# Patient Record
Sex: FEMALE | Race: BLACK OR AFRICAN AMERICAN | NOT HISPANIC OR LATINO | Employment: OTHER | URBAN - METROPOLITAN AREA
[De-identification: names, ages, dates, MRNs, and addresses within clinical notes are randomized per-mention and may not be internally consistent; named-entity substitution may affect disease eponyms.]

---

## 2021-08-19 ENCOUNTER — HOSPITAL ENCOUNTER (INPATIENT)
Facility: HOSPITAL | Age: 76
LOS: 6 days | Discharge: HOME HEALTH | End: 2021-08-25
Attending: EMERGENCY MEDICINE | Admitting: INTERNAL MEDICINE

## 2021-08-19 ENCOUNTER — APPOINTMENT (OUTPATIENT)
Dept: GENERAL RADIOLOGY | Facility: HOSPITAL | Age: 76
End: 2021-08-19

## 2021-08-19 DIAGNOSIS — A41.9 SEPSIS WITHOUT ACUTE ORGAN DYSFUNCTION, DUE TO UNSPECIFIED ORGANISM (HCC): ICD-10-CM

## 2021-08-19 DIAGNOSIS — J18.9 COMMUNITY ACQUIRED PNEUMONIA OF RIGHT UPPER LOBE OF LUNG: Primary | ICD-10-CM

## 2021-08-19 PROBLEM — U07.1 COVID-19 VIRUS DETECTED: Status: ACTIVE | Noted: 2021-08-19

## 2021-08-19 PROBLEM — R91.8 LUNG MASS: Status: ACTIVE | Noted: 2021-08-19

## 2021-08-19 PROBLEM — B33.8 RSV INFECTION: Status: ACTIVE | Noted: 2021-08-19

## 2021-08-19 LAB
ALBUMIN SERPL-MCNC: 3.4 G/DL (ref 3.5–5.2)
ALBUMIN/GLOB SERPL: 0.6 G/DL
ALP SERPL-CCNC: 105 U/L (ref 39–117)
ALT SERPL W P-5'-P-CCNC: 66 U/L (ref 1–33)
ANION GAP SERPL CALCULATED.3IONS-SCNC: 12.4 MMOL/L (ref 5–15)
AST SERPL-CCNC: 111 U/L (ref 1–32)
B PARAPERT DNA SPEC QL NAA+PROBE: NOT DETECTED
B PERT DNA SPEC QL NAA+PROBE: NOT DETECTED
BASOPHILS # BLD AUTO: 0.11 10*3/MM3 (ref 0–0.2)
BASOPHILS NFR BLD AUTO: 0.8 % (ref 0–1.5)
BILIRUB SERPL-MCNC: 1.4 MG/DL (ref 0–1.2)
BUN SERPL-MCNC: 38 MG/DL (ref 8–23)
BUN/CREAT SERPL: 40.4 (ref 7–25)
C PNEUM DNA NPH QL NAA+NON-PROBE: NOT DETECTED
CALCIUM SPEC-SCNC: 9.7 MG/DL (ref 8.6–10.5)
CHLORIDE SERPL-SCNC: 106 MMOL/L (ref 98–107)
CO2 SERPL-SCNC: 25.6 MMOL/L (ref 22–29)
CREAT SERPL-MCNC: 0.94 MG/DL (ref 0.57–1)
D-LACTATE SERPL-SCNC: 2 MMOL/L (ref 0.5–2)
D-LACTATE SERPL-SCNC: 3 MMOL/L (ref 0.5–2)
DEPRECATED RDW RBC AUTO: 40.7 FL (ref 37–54)
EOSINOPHIL # BLD AUTO: 0 10*3/MM3 (ref 0–0.4)
EOSINOPHIL NFR BLD AUTO: 0 % (ref 0.3–6.2)
ERYTHROCYTE [DISTWIDTH] IN BLOOD BY AUTOMATED COUNT: 12.6 % (ref 12.3–15.4)
FLUAV SUBTYP SPEC NAA+PROBE: NOT DETECTED
FLUBV RNA ISLT QL NAA+PROBE: NOT DETECTED
GFR SERPL CREATININE-BSD FRML MDRD: 71 ML/MIN/1.73
GLOBULIN UR ELPH-MCNC: 5.7 GM/DL
GLUCOSE SERPL-MCNC: 96 MG/DL (ref 65–99)
HADV DNA SPEC NAA+PROBE: NOT DETECTED
HCOV 229E RNA SPEC QL NAA+PROBE: NOT DETECTED
HCOV HKU1 RNA SPEC QL NAA+PROBE: NOT DETECTED
HCOV NL63 RNA SPEC QL NAA+PROBE: NOT DETECTED
HCOV OC43 RNA SPEC QL NAA+PROBE: NOT DETECTED
HCT VFR BLD AUTO: 43.5 % (ref 34–46.6)
HGB BLD-MCNC: 14.8 G/DL (ref 12–15.9)
HMPV RNA NPH QL NAA+NON-PROBE: NOT DETECTED
HOLD SPECIMEN: NORMAL
HOLD SPECIMEN: NORMAL
HPIV1 RNA SPEC QL NAA+PROBE: NOT DETECTED
HPIV2 RNA SPEC QL NAA+PROBE: NOT DETECTED
HPIV3 RNA NPH QL NAA+PROBE: NOT DETECTED
HPIV4 P GENE NPH QL NAA+PROBE: NOT DETECTED
IMM GRANULOCYTES # BLD AUTO: 0.3 10*3/MM3 (ref 0–0.05)
IMM GRANULOCYTES NFR BLD AUTO: 2.1 % (ref 0–0.5)
LYMPHOCYTES # BLD AUTO: 0.57 10*3/MM3 (ref 0.7–3.1)
LYMPHOCYTES NFR BLD AUTO: 3.9 % (ref 19.6–45.3)
M PNEUMO IGG SER IA-ACNC: NOT DETECTED
MCH RBC QN AUTO: 30.6 PG (ref 26.6–33)
MCHC RBC AUTO-ENTMCNC: 34 G/DL (ref 31.5–35.7)
MCV RBC AUTO: 90.1 FL (ref 79–97)
MONOCYTES # BLD AUTO: 0.74 10*3/MM3 (ref 0.1–0.9)
MONOCYTES NFR BLD AUTO: 5.1 % (ref 5–12)
NEUTROPHILS NFR BLD AUTO: 12.9 10*3/MM3 (ref 1.7–7)
NEUTROPHILS NFR BLD AUTO: 88.1 % (ref 42.7–76)
NRBC BLD AUTO-RTO: 0.1 /100 WBC (ref 0–0.2)
NT-PROBNP SERPL-MCNC: 400.8 PG/ML (ref 0–900)
PLATELET # BLD AUTO: 442 10*3/MM3 (ref 140–450)
PMV BLD AUTO: 11.8 FL (ref 6–12)
POTASSIUM SERPL-SCNC: 3.6 MMOL/L (ref 3.5–5.2)
PROCALCITONIN SERPL-MCNC: 4.8 NG/ML (ref 0–0.25)
PROT SERPL-MCNC: 9.1 G/DL (ref 6–8.5)
QT INTERVAL: 311 MS
RBC # BLD AUTO: 4.83 10*6/MM3 (ref 3.77–5.28)
RHINOVIRUS RNA SPEC NAA+PROBE: NOT DETECTED
RSV RNA NPH QL NAA+NON-PROBE: DETECTED
SARS-COV-2 RNA NPH QL NAA+NON-PROBE: DETECTED
SODIUM SERPL-SCNC: 144 MMOL/L (ref 136–145)
TROPONIN T SERPL-MCNC: <0.01 NG/ML (ref 0–0.03)
WBC # BLD AUTO: 14.62 10*3/MM3 (ref 3.4–10.8)
WHOLE BLOOD HOLD SPECIMEN: NORMAL

## 2021-08-19 PROCEDURE — 93005 ELECTROCARDIOGRAM TRACING: CPT | Performed by: PHYSICIAN ASSISTANT

## 2021-08-19 PROCEDURE — 93010 ELECTROCARDIOGRAM REPORT: CPT | Performed by: INTERNAL MEDICINE

## 2021-08-19 PROCEDURE — 94799 UNLISTED PULMONARY SVC/PX: CPT

## 2021-08-19 PROCEDURE — 80053 COMPREHEN METABOLIC PANEL: CPT | Performed by: PHYSICIAN ASSISTANT

## 2021-08-19 PROCEDURE — 99284 EMERGENCY DEPT VISIT MOD MDM: CPT

## 2021-08-19 PROCEDURE — 71045 X-RAY EXAM CHEST 1 VIEW: CPT

## 2021-08-19 PROCEDURE — 25010000003 AMPICILLIN-SULBACTAM PER 1.5 G: Performed by: PHYSICIAN ASSISTANT

## 2021-08-19 PROCEDURE — 25010000002 AZITHROMYCIN PER 500 MG: Performed by: PHYSICIAN ASSISTANT

## 2021-08-19 PROCEDURE — 85025 COMPLETE CBC W/AUTO DIFF WBC: CPT | Performed by: PHYSICIAN ASSISTANT

## 2021-08-19 PROCEDURE — 0202U NFCT DS 22 TRGT SARS-COV-2: CPT | Performed by: PHYSICIAN ASSISTANT

## 2021-08-19 PROCEDURE — 94640 AIRWAY INHALATION TREATMENT: CPT

## 2021-08-19 PROCEDURE — 36415 COLL VENOUS BLD VENIPUNCTURE: CPT | Performed by: PHYSICIAN ASSISTANT

## 2021-08-19 PROCEDURE — 83880 ASSAY OF NATRIURETIC PEPTIDE: CPT | Performed by: PHYSICIAN ASSISTANT

## 2021-08-19 PROCEDURE — 84484 ASSAY OF TROPONIN QUANT: CPT | Performed by: PHYSICIAN ASSISTANT

## 2021-08-19 PROCEDURE — 83605 ASSAY OF LACTIC ACID: CPT | Performed by: PHYSICIAN ASSISTANT

## 2021-08-19 PROCEDURE — 87040 BLOOD CULTURE FOR BACTERIA: CPT | Performed by: PHYSICIAN ASSISTANT

## 2021-08-19 PROCEDURE — 84145 PROCALCITONIN (PCT): CPT | Performed by: PHYSICIAN ASSISTANT

## 2021-08-19 RX ORDER — SODIUM CHLORIDE 9 MG/ML
75 INJECTION, SOLUTION INTRAVENOUS CONTINUOUS
Status: DISCONTINUED | OUTPATIENT
Start: 2021-08-19 | End: 2021-08-23

## 2021-08-19 RX ORDER — ACETAMINOPHEN 325 MG/1
650 TABLET ORAL EVERY 4 HOURS PRN
Status: DISCONTINUED | OUTPATIENT
Start: 2021-08-19 | End: 2021-08-25 | Stop reason: HOSPADM

## 2021-08-19 RX ORDER — LORATADINE 10 MG/1
10 TABLET ORAL DAILY
COMMUNITY

## 2021-08-19 RX ORDER — MEDROXYPROGESTERONE ACETATE 150 MG/ML
INJECTION, SUSPENSION INTRAMUSCULAR WEEKLY
COMMUNITY
End: 2021-08-25 | Stop reason: HOSPADM

## 2021-08-19 RX ORDER — ALBUTEROL SULFATE 90 UG/1
2 AEROSOL, METERED RESPIRATORY (INHALATION)
Status: DISCONTINUED | OUTPATIENT
Start: 2021-08-19 | End: 2021-08-25 | Stop reason: HOSPADM

## 2021-08-19 RX ORDER — ONDANSETRON 2 MG/ML
4 INJECTION INTRAMUSCULAR; INTRAVENOUS EVERY 6 HOURS PRN
Status: DISCONTINUED | OUTPATIENT
Start: 2021-08-19 | End: 2021-08-25 | Stop reason: HOSPADM

## 2021-08-19 RX ORDER — SODIUM CHLORIDE 0.9 % (FLUSH) 0.9 %
10 SYRINGE (ML) INJECTION AS NEEDED
Status: DISCONTINUED | OUTPATIENT
Start: 2021-08-19 | End: 2021-08-25 | Stop reason: HOSPADM

## 2021-08-19 RX ORDER — SODIUM CHLORIDE 0.9 % (FLUSH) 0.9 %
10 SYRINGE (ML) INJECTION EVERY 12 HOURS SCHEDULED
Status: DISCONTINUED | OUTPATIENT
Start: 2021-08-19 | End: 2021-08-25 | Stop reason: HOSPADM

## 2021-08-19 RX ORDER — ENTECAVIR 0.5 MG/1
0.5 TABLET, FILM COATED ORAL DAILY
COMMUNITY
End: 2021-08-25 | Stop reason: HOSPADM

## 2021-08-19 RX ORDER — ACETAMINOPHEN 650 MG/1
650 SUPPOSITORY RECTAL EVERY 4 HOURS PRN
Status: DISCONTINUED | OUTPATIENT
Start: 2021-08-19 | End: 2021-08-25 | Stop reason: HOSPADM

## 2021-08-19 RX ORDER — ACETAMINOPHEN 160 MG/5ML
650 SOLUTION ORAL EVERY 4 HOURS PRN
Status: DISCONTINUED | OUTPATIENT
Start: 2021-08-19 | End: 2021-08-25 | Stop reason: HOSPADM

## 2021-08-19 RX ORDER — NITROGLYCERIN 0.4 MG/1
0.4 TABLET SUBLINGUAL
Status: DISCONTINUED | OUTPATIENT
Start: 2021-08-19 | End: 2021-08-25 | Stop reason: HOSPADM

## 2021-08-19 RX ADMIN — ALBUTEROL SULFATE 2 PUFF: 90 AEROSOL, METERED RESPIRATORY (INHALATION) at 23:08

## 2021-08-19 RX ADMIN — AZITHROMYCIN 500 MG: 500 INJECTION, POWDER, LYOPHILIZED, FOR SOLUTION INTRAVENOUS at 18:06

## 2021-08-19 RX ADMIN — SODIUM CHLORIDE 75 ML/HR: 9 INJECTION, SOLUTION INTRAVENOUS at 22:15

## 2021-08-19 RX ADMIN — ACETAMINOPHEN 650 MG: 325 TABLET, FILM COATED ORAL at 21:43

## 2021-08-19 RX ADMIN — AMPICILLIN AND SULBACTAM 3 G: 2; 1 INJECTION, POWDER, FOR SOLUTION INTRAMUSCULAR; INTRAVENOUS at 16:31

## 2021-08-19 RX ADMIN — SODIUM CHLORIDE, PRESERVATIVE FREE 10 ML: 5 INJECTION INTRAVENOUS at 22:14

## 2021-08-19 NOTE — ED NOTES
Pt wearing mask. Myself had mask, and eye wear/PPE when present with pt. Hand hygiene performed before and after pt care.     nAa Baldwin, PCT  08/19/21 3377

## 2021-08-19 NOTE — ED PROVIDER NOTES
EMERGENCY DEPARTMENT ENCOUNTER    Room Number:  02/02  Date of encounter:  8/19/2021  PCP: Provider, No Known  Historian: Patient  Full history not obtainable due to: None    HPI:  Chief Complaint: Fatigue    Context: Loreto Green is a 76 y.o. female who presents to the ED c/o fatigue, shortness of breath, cough, nausea, diarrhea for the past several days. She has not been feeling well for the past week and a half but symptoms have progressively worsened over the past few days. Patient's daughter tested positive for COVID-19. The patient has not been vaccinated. States that her cough is dry and nonproductive. She denies chest pain, dizziness, syncope, headache.          PAST MEDICAL HISTORY    Active Ambulatory Problems     Diagnosis Date Noted   • No Active Ambulatory Problems     Resolved Ambulatory Problems     Diagnosis Date Noted   • No Resolved Ambulatory Problems     No Additional Past Medical History         PAST SURGICAL HISTORY  No past surgical history on file.      FAMILY HISTORY  No family history on file.      SOCIAL HISTORY  Social History     Socioeconomic History   • Marital status: Single     Spouse name: Not on file   • Number of children: Not on file   • Years of education: Not on file   • Highest education level: Not on file         ALLERGIES  Patient has no allergy information on record.        REVIEW OF SYSTEMS  Review of Systems   Constitutional: Positive for fatigue. Negative for chills and fever.   HENT: Negative for congestion.    Eyes: Negative for visual disturbance.   Respiratory: Positive for cough and shortness of breath.    Cardiovascular: Negative for chest pain.   Gastrointestinal: Positive for diarrhea and nausea. Negative for abdominal pain and vomiting.   Genitourinary: Negative for difficulty urinating.   Musculoskeletal: Positive for myalgias. Negative for gait problem.   Skin: Negative for wound.   Neurological: Negative for syncope.   Psychiatric/Behavioral: Negative  for suicidal ideas.      All systems reviewed and marked as negative except as listed in HPI       PHYSICAL EXAM    I have reviewed the triage vital signs and nursing notes.    ED Triage Vitals [08/19/21 1330]   Temp Heart Rate Resp BP SpO2   96.8 °F (36 °C) (!) 128 16 156/82 96 %      Temp src Heart Rate Source Patient Position BP Location FiO2 (%)   -- -- -- -- --       Physical Exam  Constitutional:       General: She is not in acute distress.     Appearance: She is well-developed.   HENT:      Head: Normocephalic and atraumatic.   Eyes:      General: No scleral icterus.     Conjunctiva/sclera: Conjunctivae normal.   Neck:      Trachea: No tracheal deviation.   Cardiovascular:      Rate and Rhythm: Regular rhythm. Tachycardia present.   Pulmonary:      Effort: Pulmonary effort is normal.      Breath sounds: Rhonchi present.   Abdominal:      Palpations: Abdomen is soft.      Tenderness: There is no abdominal tenderness.   Musculoskeletal:         General: No deformity.      Cervical back: Normal range of motion.      Comments: Moving all extremities spontaneously   Lymphadenopathy:      Cervical: No cervical adenopathy.   Skin:     General: Skin is warm and dry.   Neurological:      Mental Status: She is alert and oriented to person, place, and time.   Psychiatric:         Behavior: Behavior normal.         Vital signs and nursing notes reviewed.            LAB RESULTS  Recent Results (from the past 24 hour(s))   ECG 12 Lead    Collection Time: 08/19/21  2:28 PM   Result Value Ref Range    QT Interval 311 ms   BNP    Collection Time: 08/19/21  3:01 PM    Specimen: Blood   Result Value Ref Range    proBNP 400.8 0.0 - 900.0 pg/mL   Troponin    Collection Time: 08/19/21  3:01 PM    Specimen: Blood   Result Value Ref Range    Troponin T <0.010 0.000 - 0.030 ng/mL   CBC Auto Differential    Collection Time: 08/19/21  3:01 PM    Specimen: Blood   Result Value Ref Range    WBC 14.62 (H) 3.40 - 10.80 10*3/mm3    RBC 4.83  3.77 - 5.28 10*6/mm3    Hemoglobin 14.8 12.0 - 15.9 g/dL    Hematocrit 43.5 34.0 - 46.6 %    MCV 90.1 79.0 - 97.0 fL    MCH 30.6 26.6 - 33.0 pg    MCHC 34.0 31.5 - 35.7 g/dL    RDW 12.6 12.3 - 15.4 %    RDW-SD 40.7 37.0 - 54.0 fl    MPV 11.8 6.0 - 12.0 fL    Platelets 442 140 - 450 10*3/mm3    Neutrophil % 88.1 (H) 42.7 - 76.0 %    Lymphocyte % 3.9 (L) 19.6 - 45.3 %    Monocyte % 5.1 5.0 - 12.0 %    Eosinophil % 0.0 (L) 0.3 - 6.2 %    Basophil % 0.8 0.0 - 1.5 %    Immature Grans % 2.1 (H) 0.0 - 0.5 %    Neutrophils, Absolute 12.90 (H) 1.70 - 7.00 10*3/mm3    Lymphocytes, Absolute 0.57 (L) 0.70 - 3.10 10*3/mm3    Monocytes, Absolute 0.74 0.10 - 0.90 10*3/mm3    Eosinophils, Absolute 0.00 0.00 - 0.40 10*3/mm3    Basophils, Absolute 0.11 0.00 - 0.20 10*3/mm3    Immature Grans, Absolute 0.30 (H) 0.00 - 0.05 10*3/mm3    nRBC 0.1 0.0 - 0.2 /100 WBC   Procalcitonin    Collection Time: 08/19/21  3:01 PM    Specimen: Blood   Result Value Ref Range    Procalcitonin 4.80 (H) 0.00 - 0.25 ng/mL       Ordered the above labs and independently reviewed the results.        RADIOLOGY  XR Chest 1 View    Result Date: 8/19/2021  PORTABLE CHEST 08/19/2021 AT 2:27 PM  CLINICAL HISTORY: CHF. COPD.  There is no previous chest x-ray for comparison.  There is a large approximately 8.4 x 6.4 cm diameter focus of dense consolidation within the right upper lobe consistent with pneumonia. An underlying mass in this region cannot be excluded. Continued chest x-ray follow-up is recommended to document complete clearing with appropriate medical treatment. The left lung is well expanded and clear. There are no pleural effusions. The cardiomediastinal silhouette is unremarkable. 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 The humeral stem component of the prosthesis has eroded through the lateral margin of the humerus, and is located within the soft tissues of the forearm. There is marked erosion of the humeral head and glenoid fossa, and the humerus  itself has an overall gracile appearance.  IMPRESSIONS: Dense consolidation in the right upper lobe consistent with pneumonia as described in more detail above.  This report was finalized on 8/19/2021 2:48 PM by Dr. Burak Wheeler M.D.        I ordered the above noted radiological studies. Independently reviewed by me and discussed with radiologist.  See dictation above for official radiology interpretation.      PROCEDURES    Procedures        MEDICATIONS GIVEN IN ER    Medications   sodium chloride 0.9 % flush 10 mL (has no administration in time range)   ampicillin-sulbactam (UNASYN) 3 g in sodium chloride 0.9 % 100 mL IVPB-VTB (has no administration in time range)   AZITHROMYCIN 500 MG/250 ML 0.9% NS IVPB (vial-mate) (has no administration in time range)         PROGRESS, DATA ANALYSIS, CONSULTS, AND MEDICAL DECISION MAKING    All labs have been independently reviewed by me.  All radiology studies have been reviewed by me.   EKG's independently reviewed by me.  Discussion below represents my analysis of pertinent findings related to patient's condition, differential diagnosis, treatment plan and final disposition.    DIFFERENTIAL DIAGNOSIS INCLUDE BUT NOT LIMITED TO:     COVID-19, bacterial pneumonia, viral syndrome    ED Course as of Aug 19 1545   Thu Aug 19, 2021   1456 Heart Rate(!): 128 [DC]   1457 I viewed CXR on PACS system.  My interpretation is no pneumothorax.        [DC]   1459 EKG interpreted by myself.  Time 1428.  Sinus rhythm.  Heart 119.  Normal axis.  Normal intervals.  No acute ST abnormality.    [TD]   1515 WBC(!): 14.62 [DC]   1544 I spoke with MD Zachary at this time regarding the patient.  I discussed work-up, results, concerns.  I discussed the consulting provider's desire for tele admit.        [DC]      ED Course User Index  [DC] Burak Mcfarland PA  [TD] Carl Wright II, MD       AS OF 15:45 EDT VITALS:    BP - 156/82  HR - (!) 128  TEMP - 96.8 °F (36 °C)  02 SATS - 96%    1546 I  rechecked the patient.  I discussed the patient's radiology findings (including all incidental findings), diagnosis, and plan for admission.  All questions answered.    DIAGNOSIS  Final diagnoses:   Community acquired pneumonia of right upper lobe of lung   Sepsis without acute organ dysfunction, due to unspecified organism (CMS/HCC)         DISPOSITION  D/C    Pt masked in first look. I wore a surgical mask throughout my encounters with the pt. I performed hand hygiene on entry into the pt room and upon exit.      Burak Mcfarland PA  08/19/21 1548

## 2021-08-19 NOTE — ED TRIAGE NOTES
Pt to ER via EMS from home. Per EMS, pt c/o generalized weakness x1.5 weeks. Pt's daughters tested positive for COVID.     Patient in mask. This RN in appropriate PPE throughout the patient's entire encounter.

## 2021-08-20 ENCOUNTER — APPOINTMENT (OUTPATIENT)
Dept: CT IMAGING | Facility: HOSPITAL | Age: 76
End: 2021-08-20

## 2021-08-20 PROBLEM — J12.82 PNEUMONIA DUE TO COVID-19 VIRUS: Status: ACTIVE | Noted: 2021-08-19

## 2021-08-20 PROBLEM — E87.6 HYPOKALEMIA: Status: ACTIVE | Noted: 2021-08-20

## 2021-08-20 LAB
ALBUMIN SERPL-MCNC: 2.2 G/DL (ref 3.5–5.2)
ALBUMIN/GLOB SERPL: 0.5 G/DL
ALP SERPL-CCNC: 76 U/L (ref 39–117)
ALT SERPL W P-5'-P-CCNC: 53 U/L (ref 1–33)
ANION GAP SERPL CALCULATED.3IONS-SCNC: 10.7 MMOL/L (ref 5–15)
AST SERPL-CCNC: 88 U/L (ref 1–32)
BASOPHILS # BLD MANUAL: 0.08 10*3/MM3 (ref 0–0.2)
BASOPHILS NFR BLD AUTO: 1 % (ref 0–1.5)
BILIRUB SERPL-MCNC: 0.7 MG/DL (ref 0–1.2)
BUN SERPL-MCNC: 33 MG/DL (ref 8–23)
BUN/CREAT SERPL: 48.5 (ref 7–25)
BURR CELLS BLD QL SMEAR: ABNORMAL
CALCIUM SPEC-SCNC: 8.1 MG/DL (ref 8.6–10.5)
CHLORIDE SERPL-SCNC: 110 MMOL/L (ref 98–107)
CO2 SERPL-SCNC: 23.3 MMOL/L (ref 22–29)
CREAT SERPL-MCNC: 0.68 MG/DL (ref 0.57–1)
CRP SERPL-MCNC: 23.78 MG/DL (ref 0–0.5)
DEPRECATED RDW RBC AUTO: 40.6 FL (ref 37–54)
EOSINOPHIL # BLD MANUAL: 0.08 10*3/MM3 (ref 0–0.4)
EOSINOPHIL NFR BLD MANUAL: 1 % (ref 0.3–6.2)
ERYTHROCYTE [DISTWIDTH] IN BLOOD BY AUTOMATED COUNT: 12.2 % (ref 12.3–15.4)
GFR SERPL CREATININE-BSD FRML MDRD: 103 ML/MIN/1.73
GLOBULIN UR ELPH-MCNC: 4.4 GM/DL
GLUCOSE SERPL-MCNC: 83 MG/DL (ref 65–99)
HCT VFR BLD AUTO: 32.3 % (ref 34–46.6)
HGB BLD-MCNC: 10.8 G/DL (ref 12–15.9)
LYMPHOCYTES # BLD MANUAL: 0.89 10*3/MM3 (ref 0.7–3.1)
LYMPHOCYTES NFR BLD MANUAL: 11.1 % (ref 19.6–45.3)
LYMPHOCYTES NFR BLD MANUAL: 7.1 % (ref 5–12)
MCH RBC QN AUTO: 30.7 PG (ref 26.6–33)
MCHC RBC AUTO-ENTMCNC: 33.4 G/DL (ref 31.5–35.7)
MCV RBC AUTO: 91.8 FL (ref 79–97)
MONOCYTES # BLD AUTO: 0.57 10*3/MM3 (ref 0.1–0.9)
NEUTROPHILS # BLD AUTO: 6.43 10*3/MM3 (ref 1.7–7)
NEUTROPHILS NFR BLD MANUAL: 79.8 % (ref 42.7–76)
PLAT MORPH BLD: NORMAL
PLATELET # BLD AUTO: 388 10*3/MM3 (ref 140–450)
PMV BLD AUTO: 10.8 FL (ref 6–12)
POIKILOCYTOSIS BLD QL SMEAR: ABNORMAL
POTASSIUM SERPL-SCNC: 3.4 MMOL/L (ref 3.5–5.2)
PROCALCITONIN SERPL-MCNC: 2.92 NG/ML (ref 0–0.25)
PROT SERPL-MCNC: 6.6 G/DL (ref 6–8.5)
RBC # BLD AUTO: 3.52 10*6/MM3 (ref 3.77–5.28)
SODIUM SERPL-SCNC: 144 MMOL/L (ref 136–145)
WBC # BLD AUTO: 8.06 10*3/MM3 (ref 3.4–10.8)
WBC MORPH BLD: NORMAL

## 2021-08-20 PROCEDURE — 86140 C-REACTIVE PROTEIN: CPT | Performed by: HOSPITALIST

## 2021-08-20 PROCEDURE — 85007 BL SMEAR W/DIFF WBC COUNT: CPT | Performed by: INTERNAL MEDICINE

## 2021-08-20 PROCEDURE — 97165 OT EVAL LOW COMPLEX 30 MIN: CPT

## 2021-08-20 PROCEDURE — 97535 SELF CARE MNGMENT TRAINING: CPT

## 2021-08-20 PROCEDURE — 80053 COMPREHEN METABOLIC PANEL: CPT | Performed by: INTERNAL MEDICINE

## 2021-08-20 PROCEDURE — 97161 PT EVAL LOW COMPLEX 20 MIN: CPT

## 2021-08-20 PROCEDURE — 25010000003 AMPICILLIN-SULBACTAM PER 1.5 G: Performed by: HOSPITALIST

## 2021-08-20 PROCEDURE — 92610 EVALUATE SWALLOWING FUNCTION: CPT

## 2021-08-20 PROCEDURE — 94799 UNLISTED PULMONARY SVC/PX: CPT

## 2021-08-20 PROCEDURE — 85025 COMPLETE CBC W/AUTO DIFF WBC: CPT | Performed by: INTERNAL MEDICINE

## 2021-08-20 PROCEDURE — 25010000003 AMPICILLIN-SULBACTAM PER 1.5 G: Performed by: INTERNAL MEDICINE

## 2021-08-20 PROCEDURE — 97110 THERAPEUTIC EXERCISES: CPT

## 2021-08-20 PROCEDURE — 97530 THERAPEUTIC ACTIVITIES: CPT

## 2021-08-20 PROCEDURE — 71250 CT THORAX DX C-: CPT

## 2021-08-20 PROCEDURE — 97166 OT EVAL MOD COMPLEX 45 MIN: CPT

## 2021-08-20 PROCEDURE — 25010000002 ENOXAPARIN PER 10 MG: Performed by: INTERNAL MEDICINE

## 2021-08-20 PROCEDURE — 84145 PROCALCITONIN (PCT): CPT | Performed by: INTERNAL MEDICINE

## 2021-08-20 RX ORDER — POTASSIUM CHLORIDE 1.5 G/1.77G
40 POWDER, FOR SOLUTION ORAL ONCE
Status: COMPLETED | OUTPATIENT
Start: 2021-08-20 | End: 2021-08-20

## 2021-08-20 RX ADMIN — ALBUTEROL SULFATE 2 PUFF: 90 AEROSOL, METERED RESPIRATORY (INHALATION) at 20:32

## 2021-08-20 RX ADMIN — AMPICILLIN SODIUM AND SULBACTAM SODIUM 3 G: 2; 1 INJECTION, POWDER, FOR SOLUTION INTRAMUSCULAR; INTRAVENOUS at 00:12

## 2021-08-20 RX ADMIN — ALBUTEROL SULFATE 2 PUFF: 90 AEROSOL, METERED RESPIRATORY (INHALATION) at 08:35

## 2021-08-20 RX ADMIN — AMPICILLIN SODIUM AND SULBACTAM SODIUM 3 G: 2; 1 INJECTION, POWDER, FOR SOLUTION INTRAMUSCULAR; INTRAVENOUS at 17:31

## 2021-08-20 RX ADMIN — SODIUM CHLORIDE, PRESERVATIVE FREE 10 ML: 5 INJECTION INTRAVENOUS at 21:22

## 2021-08-20 RX ADMIN — ALBUTEROL SULFATE 2 PUFF: 90 AEROSOL, METERED RESPIRATORY (INHALATION) at 13:24

## 2021-08-20 RX ADMIN — SODIUM CHLORIDE, PRESERVATIVE FREE 10 ML: 5 INJECTION INTRAVENOUS at 09:32

## 2021-08-20 RX ADMIN — AMPICILLIN SODIUM AND SULBACTAM SODIUM 3 G: 2; 1 INJECTION, POWDER, FOR SOLUTION INTRAMUSCULAR; INTRAVENOUS at 09:32

## 2021-08-20 RX ADMIN — AMPICILLIN SODIUM AND SULBACTAM SODIUM 3 G: 2; 1 INJECTION, POWDER, FOR SOLUTION INTRAMUSCULAR; INTRAVENOUS at 21:51

## 2021-08-20 RX ADMIN — SODIUM CHLORIDE 75 ML/HR: 9 INJECTION, SOLUTION INTRAVENOUS at 13:31

## 2021-08-20 RX ADMIN — ENOXAPARIN SODIUM 40 MG: 40 INJECTION SUBCUTANEOUS at 09:32

## 2021-08-20 RX ADMIN — ALBUTEROL SULFATE 2 PUFF: 90 AEROSOL, METERED RESPIRATORY (INHALATION) at 15:55

## 2021-08-20 RX ADMIN — POTASSIUM CHLORIDE 40 MEQ: 1.5 POWDER, FOR SOLUTION ORAL at 13:31

## 2021-08-20 NOTE — PLAN OF CARE
Goal Outcome Evaluation:  Plan of Care Reviewed With: patient        Progress: no change  Outcome Summary: SLP attempted to see pt for swallow evaluation. Pt is in process of leaving floor for CT. Will attempt follow up this PM or as pt is available.

## 2021-08-20 NOTE — ED NOTES
.  Nursing report ED to floor  Loreto Green  76 y.o.  female    HPI (triage note):   Chief Complaint   Patient presents with   • Weakness - Generalized       Admitting doctor:   Yobany Sharma MD    Admitting diagnosis:   The primary encounter diagnosis was Community acquired pneumonia of right upper lobe of lung. A diagnosis of Sepsis without acute organ dysfunction, due to unspecified organism (CMS/HCC) was also pertinent to this visit.    Code status:   Current Code Status     Date Active Code Status Order ID Comments User Context       8/19/2021 1757 CPR [id number]  Yobany Sharma MD ED     Advance Care Planning Activity      Questions for Current Code Status     Question Answer Comment    Code Status CPR     Medical Interventions (Level of Support Prior to Arrest) Full           Allergies:   Patient has no allergy information on record.    Weight:   There were no vitals filed for this visit.    Most recent vitals:   Vitals:    08/19/21 1330 08/19/21 1808   BP: 156/82 148/87   Pulse: (!) 128 113   Resp: 16 16   Temp: 96.8 °F (36 °C)    SpO2: 96% 94%       Active LDAs/IV Access:   Lines, Drains & Airways    Active LDAs     Name:   Placement date:   Placement time:   Site:   Days:    Peripheral IV 08/19/21 1616 Left;Upper Arm   08/19/21    1616    Arm   less than 1                Labs (abnormal labs have a star):   Labs Reviewed   RESPIRATORY PANEL PCR W/ COVID-19 (SARS-COV-2) VINAYAK/GATO/JABARI/PAD/COR/MAD/BETTY IN-HOUSE, NP SWAB IN UTM/VTP, 3-4 HR TAT - Abnormal; Notable for the following components:       Result Value    COVID19 Detected (*)     RSV, PCR Detected (*)     All other components within normal limits    Narrative:     In the setting of a positive respiratory panel with a viral infection PLUS a negative procalcitonin without other underlying concern for bacterial infection, consider observing off antibiotics or discontinuation of antibiotics and continue supportive care. If the respiratory panel is positive  "for atypical bacterial infection (Bordetella pertussis, Chlamydophila pneumoniae, or Mycoplasma pneumoniae), consider antibiotic de-escalation to target atypical bacterial infection.   COMPREHENSIVE METABOLIC PANEL - Abnormal; Notable for the following components:    BUN 38 (*)     Total Protein 9.1 (*)     Albumin 3.40 (*)     ALT (SGPT) 66 (*)     AST (SGOT) 111 (*)     Total Bilirubin 1.4 (*)     BUN/Creatinine Ratio 40.4 (*)     All other components within normal limits    Narrative:     GFR Normal >60  Chronic Kidney Disease <60  Kidney Failure <15     CBC WITH AUTO DIFFERENTIAL - Abnormal; Notable for the following components:    WBC 14.62 (*)     Neutrophil % 88.1 (*)     Lymphocyte % 3.9 (*)     Eosinophil % 0.0 (*)     Immature Grans % 2.1 (*)     Neutrophils, Absolute 12.90 (*)     Lymphocytes, Absolute 0.57 (*)     Immature Grans, Absolute 0.30 (*)     All other components within normal limits   PROCALCITONIN - Abnormal; Notable for the following components:    Procalcitonin 4.80 (*)     All other components within normal limits    Narrative:     As a Marker for Sepsis (Non-Neonates):     1. <0.5 ng/mL represents a low risk of severe sepsis and/or septic shock.  2. >2 ng/mL represents a high risk of severe sepsis and/or septic shock.    As a Marker for Lower Respiratory Tract Infections that require antibiotic therapy:  PCT on Admission     Antibiotic Therapy             6-12 Hrs later  >0.5                          Strongly Recommended            >0.25 - <0.5             Recommended  0.1 - 0.25                  Discouraged                       Remeasure/reassess PCT  <0.1                         Strongly Discouraged         Remeasure/reassess PCT      As 28 day mortality risk marker: \"Change in Procalcitonin Result\" (>80% or <=80%) if Day 0 (or Day 1) and Day 4 values are available. Refer to http://www.[employer]-pct-calculator.com/    Change in PCT <=80 %   A decrease of PCT levels below or equal to 80% " defines a positive change in PCT test result representing a higher risk for 28-day all-cause mortality of patients diagnosed with severe sepsis or septic shock.    Change in PCT >80 %   A decrease of PCT levels of more than 80% defines a negative change in PCT result representing a lower risk for 28-day all-cause mortality of patients diagnosed with severe sepsis or septic shock.              Results may be falsely decreased if patient taking Biotin.    LACTIC ACID, PLASMA - Abnormal; Notable for the following components:    Lactate 3.0 (*)     All other components within normal limits   BNP (IN-HOUSE) - Normal    Narrative:     Among patients with dyspnea, NT-proBNP is highly sensitive for the detection of acute congestive heart failure. In addition NT-proBNP of <300 pg/ml effectively rules out acute congestive heart failure with 99% negative predictive value.    Results may be falsely decreased if patient taking Biotin.     TROPONIN (IN-HOUSE) - Normal    Narrative:     Troponin T Reference Range:  <= 0.03 ng/mL-   Negative for AMI  >0.03 ng/mL-     Abnormal for myocardial necrosis.  Clinicians would have to utilize clinical acumen, EKG, Troponin and serial changes to determine if it is an Acute Myocardial Infarction or myocardial injury due to an underlying chronic condition.       Results may be falsely decreased if patient taking Biotin.     BLOOD CULTURE   BLOOD CULTURE   RAINBOW DRAW    Narrative:     The following orders were created for panel order [place] Draw.  Procedure                               Abnormality         Status                     ---------                               -----------         ------                     Green Top (Gel)[[id number]]                                  Final result               Lavender Top[[id number]]                                     Final result               Gold Top - [facility][[id number]]                                   Final result                 Please view  results for these tests on the individual orders.   URINALYSIS W/ CULTURE IF INDICATED   LACTIC ACID, REFLEX   CBC AND DIFFERENTIAL    Narrative:     The following orders were created for panel order CBC & Differential.  Procedure                               Abnormality         Status                     ---------                               -----------         ------                     CBC Auto Differential[[id number]]        Abnormal            Final result                 Please view results for these tests on the individual orders.   GREEN TOP   LAVENDER TOP   GOLD TOP - [facility]       EKG:   ECG 12 Lead   Final Result   HEART RATE= 119  bpm   RR Interval= 505  ms   NH Interval=   ms   P Horizontal Axis=   deg   P Front Axis=   deg   QRSD Interval= 88  ms   QT Interval= 311  ms   QRS Axis= 59  deg   T Wave Axis= 73  deg   - ABNORMAL ECG -   Sinus tachycardia   Electronically Signed By: Sebas Leo ([facility]) 19-Aug-2021 16:07:58   Date and Time of Study: 2021-08-19 14:28:32          Meds given in ED:   Medications   sodium chloride 0.9 % flush 10 mL (has no administration in time range)   ampicillin-sulbactam (UNASYN) 3 g in sodium chloride 0.9 % 100 mL IVPB-VTB (0 g Intravenous Stopped 8/19/21 5905)   AZITHROMYCIN 500 MG/250 ML 0.9% NS IVPB (vial-mate) (500 mg Intravenous New Bag 8/19/21 1806)       Imaging results:  No radiology results for the last day    Ambulatory status:   -     Social issues:   Social History     Socioeconomic History   • Marital status: Single     Spouse name: Not on file   • Number of children: Not on file   • Years of education: Not on file   • Highest education level: Not on file    Nursing report ED to [facility]       Krista Talavera RN  08/19/21 2002

## 2021-08-20 NOTE — THERAPY EVALUATION
Acute Care - Speech Language Pathology   Swallow Initial Evaluation [facility]     Patient Name: Loreto Green  : 1945  MRN: [id number]  Today's Date: 2021               Admit Date: 2021    Visit Dx:     ICD-10-CM ICD-9-CM   1. Community acquired pneumonia of right upper lobe of lung  J18.9 486   2. Sepsis without acute organ dysfunction, due to unspecified organism (CMS/HCC)  A41.9 038.9     995.91     Patient Active Problem List   Diagnosis   • Community acquired pneumonia of right upper lobe of lung   • COVID-19 virus detected   • RSV infection   • Lung mass     Past Medical History:   Diagnosis Date   • Asthma    • Hepatitis B     Latent    • History of hepatitis C    • Rheumatoid arthritis (CMS/HCC)      Past Surgical History:   Procedure Laterality Date   • CHOLECYSTECTOMY     • JOINT REPLACEMENT      Left hip. left and right knee       SLP Recommendation and Plan  Recommend: soft whole solids and thin liquids. Medications: with thin liquids, crushed as needed (pt baseline). Compensations: small bites/sips, upright for all PO.     SLP to sign off at this time, as no skilled dysphagia needs indicated. Please re-consult or contact this department with changes in condition or if difficulties or s/s aspiration noted.         SWALLOW EVALUATION (last 72 hours)      SLP Adult Swallow Evaluation     Row Name 21 1400       Rehab Evaluation    Document Type  evaluation  -AB    Subjective Information  no complaints  -AB    Patient Observations  alert;cooperative;agree to therapy  -AB    Patient/Family/Caregiver Comments/Observations  Pt is alert, pleasant, cooperative.   -AB    Care Plan Review  evaluation/treatment results reviewed;care plan/treatment goals reviewed;risks/benefits reviewed;current/potential barriers reviewed;patient/other agree to care plan  -AB    Patient Effort  good  -AB    Symptoms Noted During/After Treatment  none  -AB       General Information    Patient Profile  "Reviewed  yes  -AB    Pertinent History Of Current Problem  75 yo female admitted w/c/o weakness 1.5 weeks, RSV, COVID +, RUL pna v mass.   -AB    Current Method of Nutrition  pureed;thin liquids  -AB    Precautions/Limitations, Vision  WFL  -AB    Precautions/Limitations, Hearing  WFL  -AB    Prior Level of Function-Communication  WFL no concerns documented  -AB    Prior Level of Function-Swallowing  regular textures;thin liquids  -AB    Plans/Goals Discussed with  patient;agreed upon  -AB    Barriers to Rehab  none identified  -AB    Patient's Goals for Discharge  return to regular diet  -AB       Pain    Additional Documentation  Pain Scale: Numbers Pre/Post-Treatment (Group)  -AB       Pain Scale: Numbers Pre/Post-Treatment    Pretreatment Pain Rating  0/10 - no pain  -AB    Posttreatment Pain Rating  0/10 - no pain  -AB       Oral Motor Structure and Function    Oral Lesions or Structural Abnormalities and/or variants  None  -AB    Dentition Assessment  edentulous, dentures not available  -AB    Secretion Management  WNL/WFL  -AB    Mucosal Quality  moist, healthy  -AB    Gag Response  -- DNA  -AB    Volitional Swallow  WFL  -AB    Volitional Cough  WFL  -AB       Oral Musculature and Cranial Nerve Assessment    Oral Motor General Assessment  WFL  -AB       Clinical Swallow Eval    Clinical Swallow Evaluation Summary  Bedside swallow evaluation completed. Per diet orders, pt does not have dentures in hospital with her. Lunch tray is present, pt with R hand contractures, self feeding puree tray. Oral phase functional, A-P transport timely with no oral residuals. Pt is A&Ox3, states she eats \"most things,\" without dentures present, does endorse difficulty with medications, claiming she crushes difficult meds and takes with water at home. Mastication for soft solids, regular solids timely. Min cough post prandial with regular solids, with minimal palatal residue remaining. Thin liquid wash clears entirely. " Pharyngeal phase with no overt s/s aspiration, aside from cough ¼ opps regular solids. Digital palpation suggests timely initiation.   -AB       Clinical Impression    SLP Swallowing Diagnosis  functional oral phase;functional pharyngeal phase  -AB    Functional Impact  no impact on function  -AB    Swallow Criteria for Skilled Therapeutic Interventions Met  no problems identified which require skilled intervention  -AB       Recommendations    Therapy Frequency (Swallow)  evaluation only  -AB    SLP Diet Recommendation  soft textures;thin liquids  -AB    Recommended Diagnostics  No further SLP services recommended  -AB    Recommended Precautions and Strategies  upright posture during/after eating;small bites of food and sips of liquid  -AB    Oral Care Recommendations  Oral Care BID/PRN  -AB    SLP Rec. for Method of Medication Administration  meds whole;meds crushed;as tolerated  -AB    Monitor for Signs of Aspiration  yes;notify SLP if any concerns  -AB    Anticipated Discharge Disposition (SLP)  home with home health per PT/OT  -AB      User Key  (r) = Recorded By, (t) = Taken By, (c) = Cosigned By    Initials Name Effective Dates    Adelina King MS CCC-SLP 08/01/21 -           EDUCATION  The patient has been educated in the following areas:   Dysphagia (Swallowing Impairment) Modified Diet Instruction.            SLP Outcome Measures (last 72 hours)      SLP Outcome Measures     Row Name 08/20/21 1400             SLP Outcome Measures    Outcome Measure Used?  Adult NOMS  -AB         Adult FCM Scores    FCM Chosen  Swallowing  -AB      Swallowing FCM Score  6  -AB        User Key  (r) = Recorded By, (t) = Taken By, (c) = Cosigned By    Initials Name Effective Dates    Adelina King MS CCC-SLP 08/01/21 -            Time Calculation:   Time Calculation- SLP     Row Name 08/20/21 1401             Time Calculation- SLP    SLP Start Time  1200  -AB      SLP Received On  08/20/21  -AB         Untimed  Charges    [id number] Eval Oral Pharyng Swallow Minutes  75  -AB         Total Minutes    Untimed Charges Total Minutes  75  -AB       Total Minutes  75  -AB        User Key  (r) = Recorded By, (t) = Taken By, (c) = Cosigned By    Initials Name Provider Type    Adelina King, MS CCC-SLP Speech and Language Pathologist          Therapy Charges for Today     Code Description Service Date Service Provider Modifiers Qty    [id number]  ST EVAL ORAL PHARYNG SWALLOW 5 8/20/2021 Adelina Stock, MS CCC-SLP GN 1          PPE:  Patient was not wearing a face mask during this therapy encounter. Therapist used appropriate personal protective equipment including gown, eye protection, mask and gloves.  Mask used was N95/duckbill. Appropriate PPE was worn during the entire therapy session. The patient coughed during this evaluation. Therapist was within 6 feet for 15 minutes or more during the evaluation. Hand hygiene was completed before and after therapy session. Patient is in enhanced droplet precautions.            Adelina Stock MS CCC-SLP  8/20/2021

## 2021-08-20 NOTE — PLAN OF CARE
Goal Outcome Evaluation:           Progress: no change  Outcome Summary: vss, no c/o pain, remains on room air, will continue to monitor

## 2021-08-20 NOTE — PLAN OF CARE
Problem: Adult Inpatient Plan of Care  Goal: Plan of Care Review  Outcome: Ongoing, Progressing  Flowsheets (Taken 8/20/2021 9087)  Progress: no change  Plan of Care Reviewed With: patient  Outcome Summary: Patient from ER with generalized weakness, cough, SOA, nausea and diarrhea fro 1.5 week. Blood cultures drawn pending. Respiratory panel COVD + and RSV +. CXR showed dense consolidation in right upper lobe. IV antibotics given. IV fluids started. Lovenox started. A&Ox4. VSS. On RA. Will continue to monitor.

## 2021-08-20 NOTE — THERAPY EVALUATION
Patient Name: Loreto Green  : 1945    MRN: [id number]                              Today's Date: 2021       Admit Date: 2021    Visit Dx:     ICD-10-CM ICD-9-CM   1. Community acquired pneumonia of right upper lobe of lung  J18.9 486   2. Sepsis without acute organ dysfunction, due to unspecified organism (CMS/HCC)  A41.9 038.9     995.91     Patient Active Problem List   Diagnosis   • Community acquired pneumonia of right upper lobe of lung   • COVID-19 virus detected   • RSV infection   • Lung mass     Past Medical History:   Diagnosis Date   • Asthma    • Hepatitis B     Latent    • History of hepatitis C    • Rheumatoid arthritis (CMS/HCC)      Past Surgical History:   Procedure Laterality Date   • CHOLECYSTECTOMY     • JOINT REPLACEMENT      Left hip. left and right knee     General Information     Row Name 21 0950          OT Time and Intention    Document Type  evaluation  -BT     Mode of Treatment  individual therapy;occupational therapy  -BT     Row Name 21 0145          General Information    Patient Profile Reviewed  yes  -BT     Prior Level of Function  independent:;ADL's;transfer  -BT     Existing Precautions/Restrictions  fall  -BT     Barriers to Rehab  none identified  -BT     Row Name 21 0906          Living Environment    Lives With  child(joie), adult  -BT     Row Name 21 0911          Cognition    Orientation Status (Cognition)  oriented x 4  -BT     Row Name 21 0984          Safety Issues, Functional Mobility    Impairments Affecting Function (Mobility)  balance;endurance/activity tolerance;shortness of breath;strength  -BT       User Key  (r) = Recorded By, (t) = Taken By, (c) = Cosigned By    Initials Name Provider Type    BT Zaynab Burrell OT Occupational Therapist          Mobility/ADL's     Row Name 21 4800          Bed Mobility    Bed Mobility  bed mobility (all) activities  -BT     All Activities, [place] (Bed Mobility)   moderate assist (50% patient effort);verbal cues  -BT     Assistive Device (Bed Mobility)  bed rails;head of bed elevated  -BT     Row Name 08/20/21 0952          Transfers    Transfers  sit-stand transfer  -BT     Sit-Stand [place] (Transfers)  contact guard HHA  -BT     Row Name 08/20/21 0952          Functional Mobility    Functional Mobility- Ind. Level  minimum assist (75% patient effort)  -BT     Functional Mobility- Comment  pt took side steps towards HOB  -BT     Row Name 08/20/21 0952          Activities of Daily Living    BADL Assessment/Intervention  grooming;lower body dressing  -BT     Row Name 08/20/21 0952          Lower Body Dressing Assessment/Training    [place] Level (Lower Body Dressing)  doff;don;socks;dependent (less than 25% patient effort);lower body dressing skills  -BT     Position (Lower Body Dressing)  edge of bed sitting  -BT     Comment (Lower Body Dressing)  pt states she is usally independent with sock aide  -BT     Row Name 08/20/21 0952          Grooming Assessment/Training    [place] (Grooming)  grooming skills;standby assist  -BT     Position (Grooming)  edge of bed sitting  -BT       User Key  (r) = Recorded By, (t) = Taken By, (c) = Cosigned By    Initials Name Provider Type    BT Zaynab Burrell OT Occupational Therapist        Obj/Interventions     Row Name 08/20/21 0957 08/20/21 0955       Range of Motion Comprehensive    Comment, General Range of Motion  Deformed extremities from rheumatoid arthritis and prior fracture of the right humerus with deformity; LUE ROM WFL  -BT  --  -BT    Row Name 08/20/21 0957          Strength Comprehensive (MMT)    Comment, General Manual Muscle Testing (MMT) Assessment  generalized weakness  -BT     Row Name 08/20/21 0957          Balance    Balance Assessment  sitting static balance;sitting dynamic balance;standing static balance;standing dynamic balance  -BT     Static Sitting Balance  WFL  -BT     Dynamic Sitting  Balance  mild impairment  -BT     Static Standing Balance  mild impairment  -BT     Dynamic Standing Balance  moderate impairment  -BT     Balance Interventions  sitting;standing;static;dynamic;occupation based/functional task  -BT       User Key  (r) = Recorded By, (t) = Taken By, (c) = Cosigned By    Initials Name Provider Type    BT Zaynab Burrell OT Occupational Therapist        Goals/Plan     Row Name 08/20/21 1017          Transfer Goal 1 (OT)    Activity/Assistive Device (Transfer Goal 1, OT)  transfers, all  -BT     [place] Level/Cues Needed (Transfer Goal 1, OT)  contact guard assist  -BT     Time Frame (Transfer Goal 1, OT)  short term goal (STG);2 weeks  -BT     Progress/Outcome (Transfer Goal 1, OT)  goal ongoing  -BT     Row Name 08/20/21 1017          Toileting Goal 1 (OT)    Activity/Device (Toileting Goal 1, OT)  toileting skills, all  -BT     [place] Level/Cues Needed (Toileting Goal 1, OT)  minimum assist (75% or more patient effort)  -BT     Time Frame (Toileting Goal 1, OT)  short term goal (STG);2 weeks  -BT     Progress/Outcome (Toileting Goal 1, OT)  goal ongoing  -BT     Row Name 08/20/21 1017          Grooming Goal 1 (OT)    Activity/Device (Grooming Goal 1, OT)  grooming skills, all  -BT     [place] (Grooming Goal 1, OT)  set-up required  -BT     Time Frame (Grooming Goal 1, OT)  short term goal (STG);2 weeks  -BT     Progress/Outcome (Grooming Goal 1, OT)  goal ongoing  -BT     Row Name 08/20/21 1017          Therapy Assessment/Plan (OT)    Planned Therapy Interventions (OT)  activity tolerance training;BADL retraining;occupation/activity based interventions;transfer/mobility retraining;strengthening exercise  -BT       User Key  (r) = Recorded By, (t) = Taken By, (c) = Cosigned By    Initials Name Provider Type    BT Zaynab Burrell OT Occupational Therapist        Clinical Impression     Row Name 08/20/21 0958          Pain Assessment    Additional Documentation  Pain  Scale: Numbers Pre/Post-Treatment (Group)  -BT     Row Name 08/20/21 0958          Pain Scale: Numbers Pre/Post-Treatment    Pretreatment Pain Rating  0/10 - no pain  -BT     Posttreatment Pain Rating  0/10 - no pain  -BT     Row Name 08/20/21 0958 08/20/21 0957       Plan of Care Review    Plan of Care Reviewed With  patient  -BT  patient  -BT    Outcome Summary  Pt is a 75 yo female admitted with generalized weakness, cough, SOA, and N&V. Pt found to be positive for Covid and RSV. Pt A&O x 4 and reports being independent with adls and functional transfers at baseline. On this date, pt required mod A for bed mobility, CGA for STS transfer, and min A for side steps towards HOB. Pt completed grooming task at EOB with SBA and dep for LB adls on this date due to fatigue. Pt states she is typically able to don/doff socks independently with a sock aide. Pt demo'd decreased strength and activity tolerance requiring moderate rest breaks after about 1 minute of static standing. Pt would benefit from skilled OT services to address these deficits. DC recs include [facility] vs HH pending pt progress.  -BT  --    Row Name 08/20/21 0958          Therapy Assessment/Plan (OT)    Rehab Potential (OT)  good, to achieve stated therapy goals  -BT     Criteria for Skilled Therapeutic Interventions Met (OT)  yes  -BT     Therapy Frequency (OT)  5 times/wk  -BT     Row Name 08/20/21 0958          Positioning and Restraints    Pre-Treatment Position  in bed  -BT     Post Treatment Position  bed  -BT     In Bed  supine;call light within reach;encouraged to call for assist;exit alarm on;with nsg  -BT       User Key  (r) = Recorded By, (t) = Taken By, (c) = Cosigned By    Initials Name Provider Type    BT Zaynab Burrell, OT Occupational Therapist        Outcome Measures     Row Name 08/20/21 1018          How much help from another is currently needed...    Putting on and taking off regular lower body clothing?  2  -BT     Bathing (including  washing, rinsing, and drying)  2  -BT     Toileting (which includes using toilet bed pan or urinal)  2  -BT     Putting on and taking off regular upper body clothing  3  -BT     Taking care of personal grooming (such as brushing teeth)  3  -BT     Eating meals  4  -BT     AM-PAC 6 Clicks Score (OT)  16  -BT     Row Name 08/20/21 1018          Functional Assessment    Outcome Measure Options  AM-PAC 6 Clicks Daily Activity (OT)  -BT       User Key  (r) = Recorded By, (t) = Taken By, (c) = Cosigned By    Initials Name Provider Type    Zaynab Camargo OT Occupational Therapist          Occupational Therapy Education                 Title: PT OT SLP Therapies (Done)     Topic: Occupational Therapy (Done)     Point: ADL training (Done)     Description:   Instruct learner(s) on proper safety adaptation and remediation techniques during self care or transfers.   Instruct in proper use of assistive devices.              Learning Progress Summary           Patient Acceptance, E, VU by BT at 8/20/2021 1019    Comment: purpose of OT, adl retraining, functional transfer training                   Point: Home exercise program (Done)     Description:   Instruct learner(s) on appropriate technique for monitoring, assisting and/or progressing therapeutic exercises/activities.              Learning Progress Summary           Patient Acceptance, E, VU by BT at 8/20/2021 1019    Comment: purpose of OT, adl retraining, functional transfer training                   Point: Precautions (Done)     Description:   Instruct learner(s) on prescribed precautions during self-care and functional transfers.              Learning Progress Summary           Patient Acceptance, E, VU by BT at 8/20/2021 1019    Comment: purpose of OT, adl retraining, functional transfer training                   Point: Body mechanics (Done)     Description:   Instruct learner(s) on proper positioning and spine alignment during self-care, functional mobility  activities and/or exercises.              Learning Progress Summary           Patient Acceptance, E, VU by BT at 8/20/2021 1019    Comment: purpose of OT, adl retraining, functional transfer training                               User Key     Initials Effective Dates Name Provider Type Discipline     11/16/20 -  Zaynab Burrell, THO Occupational Therapist OT              OT Recommendation and Plan  Planned Therapy Interventions (OT): activity tolerance training, BADL retraining, occupation/activity based interventions, transfer/mobility retraining, strengthening exercise  Therapy Frequency (OT): 5 times/wk  Plan of Care Review  Plan of Care Reviewed With: patient  Outcome Summary: Pt is a 77 yo female admitted with generalized weakness, cough, SOA, and N&V. Pt found to be positive for Covid and RSV. Pt A&O x 4 and reports being independent with adls and functional transfers at baseline. On this date, pt required mod A for bed mobility, CGA for STS transfer, and min A for side steps towards HOB. Pt completed grooming task at EOB with SBA and dep for LB adls on this date due to fatigue. Pt states she is typically able to don/doff socks independently with a sock aide. Pt demo'd decreased strength and activity tolerance requiring moderate rest breaks after about 1 minute of static standing. Pt would benefit from skilled OT services to address these deficits. DC recs include [facility] vs HH pending pt progress.     Time Calculation:   Time Calculation- OT     Row Name 08/20/21 1020             Time Calculation- OT    OT Start Time  0904  -BT      OT Stop Time  0934  -BT      OT Time Calculation (min)  30 min  -BT      Total Timed Code Minutes- OT  25 minute(s)  -BT      OT Received On  08/20/21  -BT      OT - Next Appointment  08/23/21  -BT      OT Goal Re-Cert Due Date  09/03/21  -BT         Timed Charges    03843 - OT Self Care/Mgmt Minutes  25  -BT         Untimed Charges    OT Eval/Re-eval Minutes  5  -BT         Total  Minutes    Timed Charges Total Minutes  25  -BT      Untimed Charges Total Minutes  5  -BT       Total Minutes  30  -BT        User Key  (r) = Recorded By, (t) = Taken By, (c) = Cosigned By    Initials Name Provider Type    BT Zaynab Burrell OT Occupational Therapist        Therapy Charges for Today     Code Description Service Date Service Provider Modifiers Qty    [id number]  OT SELF CARE/MGMT/TRAIN EA 15 MIN 8/20/2021 Zaynab Burrell OT GO 2    [id number]  OT EVAL MOD COMPLEXITY 2 8/20/2021 Zaynab Burrell OT GO 1               Zaynab Burrell OT  8/20/2021

## 2021-08-20 NOTE — CASE MANAGEMENT/SOCIAL WORK
Continued Stay Note  [facility]     Patient Name: Loreto Green  MRN: [id number]  Today's Date: 8/20/2021    Admit Date: 8/19/2021    Discharge Plan     Row Name 08/20/21 1541       Plan    Plan  Return home with family    Patient/Family in Agreement with Plan  yes    Plan Comments  Spoke with daughter Camille mishra [phone] by telephone.  Multiple attempts to reach patient by telephone but she did not answer.  Patient lives with daughter, uses a cane, has used HH in the past after [facility] and has been to SNF ~2010. [facility]acy is [facility] [address] @ [address]  Patient does not drive, different children or grandchildren assist her if needed.  Camille said to speak with daughter Monserrat Espinal [phone] that she would know who PCP is - left message but have not received a return call.  Current plan for patient to return home at [place].  CCP will follow.  BH[place]eniuk RN Becky S. [place], RN

## 2021-08-20 NOTE — PROGRESS NOTES
Name: Loreto Green ADMIT: 2021   : 1945  PCP: Provider, No Known    MRN: [id number] LOS: 1 days   AGE/SEX: 76 y.o. female  ROOM: [facility]     Subjective   Subjective   No complaints, patient says she feels pretty good    Review of Systems   Respiratory: Positive for shortness of breath.    Gastrointestinal: Negative for diarrhea and nausea.        Objective   Objective   Vital Signs  Temp:  [98.4 °F (36.9 °C)-100.3 °F (37.9 °C)] 98.5 °F (36.9 °C)  Heart Rate:  [] 84  Resp:  [16-18] 16  BP: (104-148)/(64-87) 116/70  SpO2:  [93 %-97 %] 96 %  on   ;   Device (Oxygen Therapy): room air  Body mass index is 26.99 kg/m².  Physical Exam  Vitals and nursing note reviewed.   Constitutional:       General: She is not in acute distress.     Appearance: Normal appearance. She is not ill-appearing.      Comments: Thin and frail appearing   HENT:      Head: Normocephalic and atraumatic.      Mouth/Throat:      Mouth: Mucous membranes are moist.   Eyes:      General: No scleral icterus.  Neck:      Vascular: No JVD.   Cardiovascular:      Rate and Rhythm: Normal rate and regular rhythm.      Pulses: Normal pulses.      Heart sounds: Normal heart sounds. No murmur heard.     Pulmonary:      Effort: Pulmonary effort is normal. No respiratory distress.      Breath sounds: Rhonchi present.   Abdominal:      General: Bowel sounds are normal. There is no distension.      Palpations: Abdomen is soft.      Tenderness: There is no abdominal tenderness.   Musculoskeletal:         General: Deformity (Ulnar deviation of the fingers) present. No swelling or tenderness.      Cervical back: Neck supple.   Skin:     General: Skin is warm and dry.      Coloration: Skin is not jaundiced.      Findings: No rash.   Neurological:      General: No focal deficit present.      Mental Status: She is alert and oriented to person, place, and time.   Psychiatric:         Mood and Affect: Mood normal.         Behavior: Behavior normal.          Results Review     I reviewed the patient's new clinical results.  Results from last 7 days   Lab Units 08/20/21  0851 08/19/21  1501   WBC 10*3/mm3 8.06 14.62*   HEMOGLOBIN g/dL 10.8* 14.8   PLATELETS 10*3/mm3 388 442     Results from last 7 days   Lab Units 08/20/21  0851 08/19/21  1617   SODIUM mmol/L 144 144   POTASSIUM mmol/L 3.4* 3.6   CHLORIDE mmol/L 110* 106   CO2 mmol/L 23.3 25.6   BUN mg/dL 33* 38*   CREATININE mg/dL 0.68 0.94   GLUCOSE mg/dL 83 96   Estimated Creatinine Clearance: 53.2 mL/min (by C-G formula based on SCr of 0.68 mg/dL).  Results from last 7 days   Lab Units 08/20/21  0851 08/19/21  1617   ALBUMIN g/dL 2.20* 3.40*   BILIRUBIN mg/dL 0.7 1.4*   ALK PHOS U/L 76 105   AST (SGOT) U/L 88* 111*   ALT (SGPT) U/L 53* 66*     Results from last 7 days   Lab Units 08/20/21  0851 08/19/21  1617   CALCIUM mg/dL 8.1* 9.7   ALBUMIN g/dL 2.20* 3.40*     Results from last 7 days   Lab Units 08/20/21  0851 08/19/21  2233 08/19/21  1552 08/19/21  1501   PROCALCITONIN ng/mL 2.92*  --   --  4.80*   LACTATE mmol/L  --  2.0 3.0*  --      COVID19   Date Value Ref Range Status   08/19/2021 Detected (C) Not Detected - Ref. Range Final     No results found for: HGBA1C, POCGLU    CT Chest Without Contrast Diagnostic  CT CHEST WITHOUT CONTRAST DIAGNOSTIC     Radiation dose reduction techniques were utilized, including automated  exposure control and exposure modulation based on body size.     CLINICAL INFORMATION: Lung mass.     Correlation with chest radiograph 08/19/2021.     FINDINGS: There are patchy multifocal areas of ground glass infiltrate  demonstrated within the right and left lung periphery. This is involving  the right upper lobe to the greatest degree where there is associated  areas of consolidation. In the left lower lobe on image #59 there is a 5  mm noncalcified pulmonary nodule. No pleural effusion identified.  Cardiac size upper limits of normal, there is coronary artery  calcification with  atherosclerotic calcification of the aorta. There are  several mildly enlarged mediastinal lymph nodes seen, reference node in  the precarinal space is 11 mm short axis. Very small lymph nodes  demonstrated within the right supraclavicular region and thoracic inlet.  Left axillary lymph nodes are normal in appearance, the breast  parenchyma is symmetric and satisfactory in appearance. There is a  solitary enlarged pathologic right axillary lymph node measuring 17 x 13  mm. This is likely secondary to the right elbow joint replacement.     CONCLUSION: Patchy multifocal areas of infiltrate demonstrated within  the periphery of both lungs, most pronounced in the right upper lobe  where there are areas of consolidation. The overall appearance is most  worrisome for underlying COVID pneumonia. Other atypical viral pneumonia  not excluded. Enlarged mediastinal and right axillary nodes as described  above.        This report was finalized on 8/20/2021 2:46 PM by Dr. Nirav Mitchell M.D.       Scheduled Medications  albuterol sulfate HFA, 2 puff, Inhalation, 4x Daily - RT  ampicillin-sulbactam, 3 g, Intravenous, Q6H  enoxaparin, 40 mg, Subcutaneous, Q24H  sodium chloride, 10 mL, Intravenous, Q12H    Infusions  sodium chloride, 75 mL/hr, Last Rate: 75 mL/hr (08/20/21 1331)    Diet  Diet Soft Texture; Whole Foods; Thin       Assessment/Plan     Active Hospital Problems    Diagnosis  POA   • **Pneumonia due to COVID-19 virus [U07.1, J12.82]  Yes   • Hypokalemia [E87.6]  Unknown   • Rheumatoid arthritis involving multiple sites (CMS/HCC) [M06.9]  Unknown   • Immunosuppression (CMS/HCC) [D84.9]  Unknown   • RSV infection [B97.4]  Unknown      Resolved Hospital Problems   No resolved problems to display.       76 y.o. female unvaccinated against COVID-19, with history of rheumatoid arthritis on Enbrel admitted with Pneumonia due to COVID-19 virus.      · Pneumonia probably secondary to COVID-19.  It does have an unusual  appearance on chest x-ray but CT was suggestive of multifocal pneumonia more along the lines of Covid pneumonia.  However procalcitonin is quite elevated and probably warrants treatment for potential superimposed bacterial infection  · Would consider repeating the CT scan in 4 to 6 weeks after treatment for pneumonia  · Will not start her on dexamethasone due to this question of bacterial superinfection as well as lack of hypoxia.  Also not really a candidate for remdesivir without hypoxia  · Patient definitely needs to be vaccinated for Covid.  · Replace potassium    · Lovenox 40 mg SC daily for DVT prophylaxis.  · Dispo: We will watch her for a day or 2 here and ensure that inflammatory markers are heading the right way and no oxygen requirement develops.      Orlando Tamayo MD  [facility]  08/20/21  17:22 EDT

## 2021-08-20 NOTE — PAYOR COMM NOTE
"Loreto Barahona (76 y.o. Female)     PLEASE SEE ATTACHED FOR INPT NOTIFICATION ONLY.     PLEASE CALL   OR  [phone] IF YOU HAVE ANY QUESTIONS.     THANK YOU    LUPIS HSU LPN CCP    Date of Birth Social Security Number Address Home Phone MRN    1945  [id number] [facility] [phone] [id number]    Temple Marital Status          [facility] Single       Admission Date Admission Type Admitting Provider Attending Provider Department, Room/Bed    8/19/21 Emergency Yobany Sharma MD [place], Matthew Brett, MD [address], S612/1    Discharge Date Discharge Disposition Discharge Destination                       Attending Provider: Orlando Tamayo MD    Allergies: No Known Allergies    Isolation: Enh Drop/Con, Contact   Infection: COVID (suspected, test negative) (08/19/21), RSV (08/19/21), COVID (confirmed) (08/19/21)   Code Status: CPR    Ht: 154.9 cm (61\")   Wt: 64.8 kg (142 lb 13.7 oz)    Admission Cmt: None   Principal Problem: Community acquired pneumonia of right upper lobe of lung [J18.9]                 Active Insurance as of 8/19/2021     Primary Coverage     Payor Plan Insurance Group Employer/Plan Group    MEDICARE MEDICARE B ONLY      Payor Plan Address Payor Plan Phone Number Payor Plan Fax Number Effective Dates    [address] [address] [phone]  8/1/2012 - None Entered    [facility] [address]       Subscriber Name Subscriber Birth Date Member ID       LORETO BARAHONA 1945 [id number]           Secondary Coverage     Payor Plan Insurance Group Employer/Plan Group    [place] [id number]     Payor Plan Address Payor Plan Phone Number Payor Plan Fax Number Effective Dates    [employer] [address] [address] [phone]  7/1/2021 - None Entered    [facility] [id number]       Subscriber Name Subscriber Birth Date Member ID       LORETO BARAHONA 1945 [id number]                 Emergency Contacts  "     (Rel.) Home Phone Work Phone Mobile Phone    CLAUDE WEI (Daughter) [phone] -- --    Monserrat Espinal (Daughter) [phone] -- --               History & Physical      Yobany Sharma MD at [phone]          Internal medicine history and physical  INTERNAL MEDICINE   [facility]       Patient Identification:  Name: Loreto Green  Age: 76 y.o.  Sex: female  :  1945  MRN: [id number]                   Primary Care Physician: Provider, No Known                               Date of admission:2021    Chief Complaint: Progressive weakness for a week and a half and exposure to Covid.    History of Present Illness:   Patient is a 76-year-old female that feels remarkable for rheumatoid arthritis since the age of 20 or21 and has been on various treatment and currently she is on Enbrel infusion on a monthly basis, history of latent hepatitis B and chronic hepatitis C and currently on antiviral treatment - entecavir -under the care of Dr. Longoria and follows with [facility] that last visit with them in May 2021 when she was considered to be doing well and plan was for her to follow-up in 6 months on her usual state of health global week and a half ago when she started having increasing cough shortness of breath fatigue nausea vomiting and diarrhea.  She has been getting progressively weak.  She has not been vaccinated against COVID-19.  Patient also tested with COVID-19 resulting in her getting concerned that her symptoms could be due to Covid.  EMS was called and patient was brought to the emergency room for further evaluation where work-up revealed dense consolidation in the right upper lobe concerning for pneumonia and a possible underlying mass.  A respiratory viral panel come back positive for COVID-19 as well as RSV.  Patient was noted to be tachycardic upon presentation but was able to maintain her oxygen saturation above 94% on room air.   Patient has been appropriately started on antibacterial treatment for possible postobstructive/community-acquired pneumonia involving the right upper lung and is being admitted for further care.    Past Medical History:  No past medical history on file.  Past Surgical History:  No past surgical history on file.   Home Meds:  (Not in a hospital admission)    Current Meds:     Current Facility-Administered Medications:   •  AZITHROMYCIN 500 MG/250 ML 0.9% NS IVPB (vial-mate), 500 mg, Intravenous, Once, Burak Mcfarland PA  •  sodium chloride 0.9 % flush 10 mL, 10 mL, Intravenous, PRN, Burak Mcfarland PA  No current outpatient medications on file.  Allergies:  Not on File  Social History:   Social History     Tobacco Use   • Smoking status: Not on file   Substance Use Topics   • Alcohol use: Not on file      Family History:  No family history on file.       Review of Systems  See history of present illness and past medical history.    Constitutional: Positive for fatigue. Negative for chills and fever.   HENT: Negative for congestion.    Eyes: Negative for visual disturbance.   Respiratory: Positive for cough and shortness of breath.    Cardiovascular: Negative for chest pain.   Gastrointestinal: Positive for diarrhea and nausea. Negative for abdominal pain and vomiting.   Genitourinary: Negative for difficulty urinating.   Musculoskeletal: Positive for myalgias. Negative for gait problem.   Skin: Negative for wound.   Neurological: Negative for syncope.   Psychiatric/Behavioral: Negative for suicidal ideas.   Remainder of ROS is negative.      Vitals:   /82   Pulse (!) 128   Temp 96.8 °F (36 °C)   Resp 16   SpO2 96%   I/O: No intake or output data in the 24 hours ending 08/19/21 6085  Exam:  Patient is examined using the personal protective equipment as per guidelines from infection control for this particular patient as enacted.  Hand washing was performed before and after patient interaction.  General  Appearance:    Alert, cooperative, no distress, appears stated age   Head:    Normocephalic, without obvious abnormality, atraumatic   Eyes:    PERRL, conjunctiva/corneas clear, EOM's intact, both eyes   Ears:    Normal external ear canals, both ears   Nose:   Nares normal, septum midline, mucosa normal, no drainage    or sinus tenderness   Throat:   Lips, tongue, gums normal; oral mucosa pink and moist   Neck:   Supple, symmetrical, trachea midline, no adenopathy;     thyroid:  no enlargement/tenderness/nodules; no carotid    bruit or JVD   Back:     Symmetric, no curvature, ROM normal, no CVA tenderness   Lungs:    Decreased breath sounds bilaterally no obvious use of accessory muscles of breathing noted   Chest Wall:    No tenderness or deformity    Heart:    Regular rate and rhythm, S1 and S2 normal, no murmur, rub   or gallop   Abdomen:    Soft nontender   Extremities:  Deformed extremities from rheumatoid arthritis and prior fracture of the right humerus with deformity.   Pulses:   Pulses palpable in all extremities; symmetric all extremities   Skin:   Skin color normal, Skin is warm and dry,  no rashes or palpable lesions   Neurologic:  Grossly nonfocal       Data Review:      I reviewed the patient's new clinical results.  Results from last 7 days   Lab Units 08/19/21  1501   WBC 10*3/mm3 14.62*   HEMOGLOBIN g/dL 14.8   PLATELETS 10*3/mm3 442     Results from last 7 days   Lab Units 08/19/21  1617   SODIUM mmol/L 144   POTASSIUM mmol/L 3.6   CHLORIDE mmol/L 106   CO2 mmol/L 25.6   BUN mg/dL 38*   CREATININE mg/dL 0.94   CALCIUM mg/dL 9.7   GLUCOSE mg/dL 96     PORTABLE CHEST 08/19/2021 AT 2:27 PM       CLINICAL HISTORY: CHF. COPD.       There is no previous chest x-ray for comparison.       There is a large approximately 8.4 x 6.4 cm diameter focus of dense   consolidation within the right upper lobe consistent with pneumonia. An   underlying mass in this region cannot be excluded. Continued chest x-ray    follow-up is recommended to document complete clearing with appropriate   medical treatment. The left lung is well expanded and clear. There are   no pleural effusions. The cardiomediastinal silhouette is unremarkable.   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 The humeral stem component of   the prosthesis has eroded through the lateral margin of the humerus, and   is located within the soft tissues of the forearm. There is marked   erosion of the humeral head and glenoid fossa, and the humerus itself   has an overall gracile appearance.       IMPRESSIONS: Dense consolidation in the right upper lobe consistent with   pneumonia as described in more detail above.     ECG 12 Lead   Final Result   HEART RATE= 119  bpm   RR Interval= 505  ms   NM Interval=   ms   P Horizontal Axis=   deg   P Front Axis=   deg   QRSD Interval= 88  ms   QT Interval= 311  ms   QRS Axis= 59  deg   T Wave Axis= 73  deg   - ABNORMAL ECG -   Sinus tachycardia   Electronically Signed By: Sebas Leo (Banner) 19-Aug-2021 16:07:58   Date and Time of Study: 2021-08-19 14:28:32        Microbiology Results (last 10 days)     Procedure Component Value - Date/Time    Respiratory Panel PCR w/COVID-19(SARS-CoV-2) VINAYAK/GATO/JABARI/PAD/COR/MAD/BETTY In-House, NP Swab in UTM/VTM, 3-4 HR TAT - Swab, Nasopharynx [[id number]]  (Abnormal) Collected: 08/19/21 1434    Lab Status: Final result Specimen: Swab from Nasopharynx Updated: 08/19/21 1726     ADENOVIRUS, PCR Not Detected     Coronavirus 229E Not Detected     Coronavirus HKU1 Not Detected     Coronavirus NL63 Not Detected     Coronavirus OC43 Not Detected     COVID19 Detected     Human Metapneumovirus Not Detected     Human Rhinovirus/Enterovirus Not Detected     Influenza A PCR Not Detected     Influenza B PCR Not Detected     Parainfluenza Virus 1 Not Detected     Parainfluenza Virus 2 Not Detected     Parainfluenza Virus 3 Not Detected     Parainfluenza Virus 4 Not Detected     RSV, PCR Detected      Bordetella pertussis pcr Not Detected     Bordetella parapertussis PCR Not Detected     Chlamydophila pneumoniae PCR Not Detected     Mycoplasma pneumo by PCR Not Detected    Narrative:      In the setting of a positive respiratory panel with a viral infection PLUS a negative procalcitonin without other underlying concern for bacterial infection, consider observing off antibiotics or discontinuation of antibiotics and continue supportive care. If the respiratory panel is positive for atypical bacterial infection (Bordetella pertussis, Chlamydophila pneumoniae, or Mycoplasma pneumoniae), consider antibiotic de-escalation to target atypical bacterial infection.          Assessment:  Active Hospital Problems    Diagnosis  POA   • **Community acquired pneumonia of right upper lobe of lung [J18.9]  Yes   • COVID-19 virus detected [U07.1]  Unknown   • RSV infection [B97.4]  Unknown   • Lung mass [R91.8]  Unknown       Medical decision making:  Right upper lobe pneumonia-either community-acquired pneumonia or postobstructive pneumonia due to underlying mass.  Plan is to admit the patient monitor her for oxygen needs, continue with Zithromax and Unasyn.  Check urine Legionella and pneumococcal antigen.  Check CT scan of the chest to assess for possibility of underlying mass.  Provide IV fluids and monitor her lactic acid level.  Multiple systemic viral illness with respiratory viral panel positive for RSV and COVID-19 with preserved lung function and no evidence of hypoxia.  Plan is to monitor her for any oxygen supplementation needs and continue with supportive care with pulse ox monitoring.  Immunocompromised host due to underlying history of rheumatoid arthritis and ongoing Enbrel infusion on a weekly basis.  Plan is to monitor her clinical course for any deterioration of her functional status due to pneumonia and ongoing infection with RSV and COVID-19.  Patient has not been vaccinated against Covid.  History of latent  hepatitis B infection and hepatitis C currently on entecavir based on the review of records but the details of her medications are not available at this time.  Plan is to gather more information about her home medications including ongoing treatment of hepatitis C we will consider introducing it.  Patient has mildly abnormal LFTs which could be multifactorial including ongoing issue with her hepatitis infection, rheumatoid arthritis as well as superimposed infection caused by COVID-19.  Plan is to monitor her liver function.      Yobany Sharma MD   8/19/2021  17:54 EDT  Much of this encounter note is an electronic transcription/translation of spoken language to printed text. The electronic translation of spoken language may permit erroneous, or at times, nonsensical words or phrases to be inadvertently transcribed; Although I have reviewed the note for such errors, some may still exist      Electronically signed by Yobany Sharma MD at 08/20/21 0547          Emergency Department Notes      Lyubov Rice, RN at 08/19/21 1329        Pt to ER via EMS from home. Per EMS, pt c/o generalized weakness x1.5 weeks. Pt's daughters tested positive for COVID.     Patient in mask. This RN in appropriate PPE throughout the patient's entire encounter.       Electronically signed by Lyubov Rice RN at 08/19/21 1330     Burak Mcfarland PA at 08/19/21 1359           EMERGENCY DEPARTMENT ENCOUNTER    Room Number:  02/02  Date of encounter:  8/19/2021  PCP: Provider, No Known  Historian: Patient  Full history not obtainable due to: None    HPI:  Chief Complaint: Fatigue    Context: Loreto Green is a 76 y.o. female who presents to the ED c/o fatigue, shortness of breath, cough, nausea, diarrhea for the past several days. She has not been feeling well for the past week and a half but symptoms have progressively worsened over the past few days. Patient's daughter tested positive for COVID-19. The patient has not been  vaccinated. States that her cough is dry and nonproductive. She denies chest pain, dizziness, syncope, headache.          PAST MEDICAL HISTORY    Active Ambulatory Problems     Diagnosis Date Noted   • No Active Ambulatory Problems     Resolved Ambulatory Problems     Diagnosis Date Noted   • No Resolved Ambulatory Problems     No Additional Past Medical History         PAST SURGICAL HISTORY  No past surgical history on file.      FAMILY HISTORY  No family history on file.      SOCIAL HISTORY  Social History     Socioeconomic History   • Marital status: Single     Spouse name: Not on file   • Number of children: Not on file   • Years of education: Not on file   • Highest education level: Not on file         ALLERGIES  Patient has no allergy information on record.        REVIEW OF SYSTEMS  Review of Systems   Constitutional: Positive for fatigue. Negative for chills and fever.   HENT: Negative for congestion.    Eyes: Negative for visual disturbance.   Respiratory: Positive for cough and shortness of breath.    Cardiovascular: Negative for chest pain.   Gastrointestinal: Positive for diarrhea and nausea. Negative for abdominal pain and vomiting.   Genitourinary: Negative for difficulty urinating.   Musculoskeletal: Positive for myalgias. Negative for gait problem.   Skin: Negative for wound.   Neurological: Negative for syncope.   Psychiatric/Behavioral: Negative for suicidal ideas.      All systems reviewed and marked as negative except as listed in HPI       PHYSICAL EXAM    I have reviewed the triage vital signs and nursing notes.    ED Triage Vitals [08/19/21 1330]   Temp Heart Rate Resp BP SpO2   96.8 °F (36 °C) (!) 128 16 156/82 96 %      Temp src Heart Rate Source Patient Position BP Location FiO2 (%)   -- -- -- -- --       Physical Exam  Constitutional:       General: She is not in acute distress.     Appearance: She is well-developed.   HENT:      Head: Normocephalic and atraumatic.   Eyes:      General: No  scleral icterus.     Conjunctiva/sclera: Conjunctivae normal.   Neck:      Trachea: No tracheal deviation.   Cardiovascular:      Rate and Rhythm: Regular rhythm. Tachycardia present.   Pulmonary:      Effort: Pulmonary effort is normal.      Breath sounds: Rhonchi present.   Abdominal:      Palpations: Abdomen is soft.      Tenderness: There is no abdominal tenderness.   Musculoskeletal:         General: No deformity.      Cervical back: Normal range of motion.      Comments: Moving all extremities spontaneously   Lymphadenopathy:      Cervical: No cervical adenopathy.   Skin:     General: Skin is warm and dry.   Neurological:      Mental Status: She is alert and oriented to person, place, and time.   Psychiatric:         Behavior: Behavior normal.         Vital signs and nursing notes reviewed.            LAB RESULTS  Recent Results (from the past 24 hour(s))   ECG 12 Lead    Collection Time: 08/19/21  2:28 PM   Result Value Ref Range    QT Interval 311 ms   BNP    Collection Time: 08/19/21  3:01 PM    Specimen: Blood   Result Value Ref Range    proBNP 400.8 0.0 - 900.0 pg/mL   Troponin    Collection Time: 08/19/21  3:01 PM    Specimen: Blood   Result Value Ref Range    Troponin T <0.010 0.000 - 0.030 ng/mL   CBC Auto Differential    Collection Time: 08/19/21  3:01 PM    Specimen: Blood   Result Value Ref Range    WBC 14.62 (H) 3.40 - 10.80 10*3/mm3    RBC 4.83 3.77 - 5.28 10*6/mm3    Hemoglobin 14.8 12.0 - 15.9 g/dL    Hematocrit 43.5 34.0 - 46.6 %    MCV 90.1 79.0 - 97.0 fL    MCH 30.6 26.6 - 33.0 pg    MCHC 34.0 31.5 - 35.7 g/dL    RDW 12.6 12.3 - 15.4 %    RDW-SD 40.7 37.0 - 54.0 fl    MPV 11.8 6.0 - 12.0 fL    Platelets 442 140 - 450 10*3/mm3    Neutrophil % 88.1 (H) 42.7 - 76.0 %    Lymphocyte % 3.9 (L) 19.6 - 45.3 %    Monocyte % 5.1 5.0 - 12.0 %    Eosinophil % 0.0 (L) 0.3 - 6.2 %    Basophil % 0.8 0.0 - 1.5 %    Immature Grans % 2.1 (H) 0.0 - 0.5 %    Neutrophils, Absolute 12.90 (H) 1.70 - 7.00 10*3/mm3     Lymphocytes, Absolute 0.57 (L) 0.70 - 3.10 10*3/mm3    Monocytes, Absolute 0.74 0.10 - 0.90 10*3/mm3    Eosinophils, Absolute 0.00 0.00 - 0.40 10*3/mm3    Basophils, Absolute 0.11 0.00 - 0.20 10*3/mm3    Immature Grans, Absolute 0.30 (H) 0.00 - 0.05 10*3/mm3    nRBC 0.1 0.0 - 0.2 /100 WBC   Procalcitonin    Collection Time: 08/19/21  3:01 PM    Specimen: Blood   Result Value Ref Range    Procalcitonin 4.80 (H) 0.00 - 0.25 ng/mL       Ordered the above labs and independently reviewed the results.        RADIOLOGY  XR Chest 1 View    Result Date: 8/19/2021  PORTABLE CHEST 08/19/2021 AT 2:27 PM  CLINICAL HISTORY: CHF. COPD.  There is no previous chest x-ray for comparison.  There is a large approximately 8.4 x 6.4 cm diameter focus of dense consolidation within the right upper lobe consistent with pneumonia. An underlying mass in this region cannot be excluded. Continued chest x-ray follow-up is recommended to document complete clearing with appropriate medical treatment. The left lung is well expanded and clear. There are no pleural effusions. The cardiomediastinal silhouette is unremarkable. 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 The humeral stem component of the prosthesis has eroded through the lateral margin of the humerus, and is located within the soft tissues of the forearm. There is marked erosion of the humeral head and glenoid fossa, and the humerus itself has an overall gracile appearance.  IMPRESSIONS: Dense consolidation in the right upper lobe consistent with pneumonia as described in more detail above.  This report was finalized on 8/19/2021 2:48 PM by Dr. Burak Wheeler M.D.        I ordered the above noted radiological studies. Independently reviewed by me and discussed with radiologist.  See dictation above for official radiology interpretation.      PROCEDURES    Procedures        MEDICATIONS GIVEN IN ER    Medications   sodium chloride 0.9 % flush 10 mL (has no administration in time range)    ampicillin-sulbactam (UNASYN) 3 g in sodium chloride 0.9 % 100 mL IVPB-VTB (has no administration in time range)   AZITHROMYCIN 500 MG/250 ML 0.9% NS IVPB (vial-mate) (has no administration in time range)         PROGRESS, DATA ANALYSIS, CONSULTS, AND MEDICAL DECISION MAKING    All labs have been independently reviewed by me.  All radiology studies have been reviewed by me.   EKG's independently reviewed by me.  Discussion below represents my analysis of pertinent findings related to patient's condition, differential diagnosis, treatment plan and final disposition.    DIFFERENTIAL DIAGNOSIS INCLUDE BUT NOT LIMITED TO:     COVID-19, bacterial pneumonia, viral syndrome    ED Course as of Aug 19 1545   Thu Aug 19, 2021   1456 Heart Rate(!): 128 [DC]   1457 I viewed CXR on PACS system.  My interpretation is no pneumothorax.        [DC]   1459 EKG interpreted by myself.  Time 1428.  Sinus rhythm.  Heart 119.  Normal axis.  Normal intervals.  No acute ST abnormality.    [TD]   1515 WBC(!): 14.62 [DC]   1544 I spoke with MD Zachary at this time regarding the patient.  I discussed work-up, results, concerns.  I discussed the consulting provider's desire for tele admit.        [DC]      ED Course User Index  [DC] Burak Mcfarland PA  [TD] Carl Wright II, MD       AS OF 15:45 EDT VITALS:    BP - 156/82  HR - (!) 128  TEMP - 96.8 °F (36 °C)  02 SATS - 96%    1546 I rechecked the patient.  I discussed the patient's radiology findings (including all incidental findings), diagnosis, and plan for admission.  All questions answered.    DIAGNOSIS  Final diagnoses:   Community acquired pneumonia of right upper lobe of lung   Sepsis without acute organ dysfunction, due to unspecified organism (CMS/[facility])         DISPOSITION  D/C    Pt masked in first look. I wore a surgical mask throughout my encounters with the pt. I performed hand hygiene on entry into the pt room and upon exit.      Burak Mcfarland PA  08/19/21  1546      Electronically signed by Burak Mcfarland PA at 08/19/21 1546     Ana Baldwin PCT at 08/19/21 1408        Pt wearing mask. Myself had mask, and eye wear/PPE when present with pt. Hand hygiene performed before and after pt care.     Ana Baldwin PCT  08/19/21 1409      Electronically signed by Ana Baldwin PCT at 08/19/21 1409     Krista Talavera, RN at 08/19/21 2002          .  Nursing report ED to floor  Loreto Green  76 y.o.  female    HPI (triage note):   Chief Complaint   Patient presents with   • Weakness - Generalized       Admitting doctor:   Yobany Sharma MD    Admitting diagnosis:   The primary encounter diagnosis was Community acquired pneumonia of right upper lobe of lung. A diagnosis of Sepsis without acute organ dysfunction, due to unspecified organism (CMS/HCC) was also pertinent to this visit.    Code status:   Current Code Status     Date Active Code Status Order ID Comments User Context       8/19/2021 1757 CPR [id number]  Yobany Sharma MD ED     Advance Care Planning Activity      Questions for Current Code Status     Question Answer Comment    Code Status CPR     Medical Interventions (Level of Support Prior to Arrest) Full           Allergies:   Patient has no allergy information on record.    Weight:   There were no vitals filed for this visit.    Most recent vitals:   Vitals:    08/19/21 1330 08/19/21 1808   BP: 156/82 148/87   Pulse: (!) 128 113   Resp: 16 16   Temp: 96.8 °F (36 °C)    SpO2: 96% 94%       Active LDAs/IV Access:   Lines, Drains & Airways    Active LDAs     Name:   Placement date:   Placement time:   Site:   Days:    Peripheral IV 08/19/21 1616 Left;Upper Arm   08/19/21    1616    Arm   less than 1                Labs (abnormal labs have a star):   Labs Reviewed   RESPIRATORY PANEL PCR W/ COVID-19 (SARS-COV-2) VINAYAK/GATO/JABARI/PAD/COR/MAD/BETTY IN-HOUSE, NP SWAB IN UTM/VTP, 3-4 HR TAT - Abnormal; Notable for the following components:       Result Value     COVID19 Detected (*)     RSV, PCR Detected (*)     All other components within normal limits    Narrative:     In the setting of a positive respiratory panel with a viral infection PLUS a negative procalcitonin without other underlying concern for bacterial infection, consider observing off antibiotics or discontinuation of antibiotics and continue supportive care. If the respiratory panel is positive for atypical bacterial infection (Bordetella pertussis, Chlamydophila pneumoniae, or Mycoplasma pneumoniae), consider antibiotic de-escalation to target atypical bacterial infection.   COMPREHENSIVE METABOLIC PANEL - Abnormal; Notable for the following components:    BUN 38 (*)     Total Protein 9.1 (*)     Albumin 3.40 (*)     ALT (SGPT) 66 (*)     AST (SGOT) 111 (*)     Total Bilirubin 1.4 (*)     BUN/Creatinine Ratio 40.4 (*)     All other components within normal limits    Narrative:     GFR Normal >60  Chronic Kidney Disease <60  Kidney Failure <15     CBC WITH AUTO DIFFERENTIAL - Abnormal; Notable for the following components:    WBC 14.62 (*)     Neutrophil % 88.1 (*)     Lymphocyte % 3.9 (*)     Eosinophil % 0.0 (*)     Immature Grans % 2.1 (*)     Neutrophils, Absolute 12.90 (*)     Lymphocytes, Absolute 0.57 (*)     Immature Grans, Absolute 0.30 (*)     All other components within normal limits   PROCALCITONIN - Abnormal; Notable for the following components:    Procalcitonin 4.80 (*)     All other components within normal limits    Narrative:     As a Marker for Sepsis (Non-Neonates):     1. <0.5 ng/mL represents a low risk of severe sepsis and/or septic shock.  2. >2 ng/mL represents a high risk of severe sepsis and/or septic shock.    As a Marker for Lower Respiratory Tract Infections that require antibiotic therapy:  PCT on Admission     Antibiotic Therapy             6-12 Hrs later  >0.5                          Strongly Recommended            >0.25 - <0.5             Recommended  0.1 - 0.25            "       Discouraged                       Remeasure/reassess PCT  <0.1                         Strongly Discouraged         Remeasure/reassess PCT      As 28 day mortality risk marker: \"Change in Procalcitonin Result\" (>80% or <=80%) if Day 0 (or Day 1) and Day 4 values are available. Refer to [email or web address]/    Change in PCT <=80 %   A decrease of PCT levels below or equal to 80% defines a positive change in PCT test result representing a higher risk for 28-day all-cause mortality of patients diagnosed with severe sepsis or septic shock.    Change in PCT >80 %   A decrease of PCT levels of more than 80% defines a negative change in PCT result representing a lower risk for 28-day all-cause mortality of patients diagnosed with severe sepsis or septic shock.              Results may be falsely decreased if patient taking Biotin.    LACTIC ACID, PLASMA - Abnormal; Notable for the following components:    Lactate 3.0 (*)     All other components within normal limits   BNP (IN-HOUSE) - Normal    Narrative:     Among patients with dyspnea, NT-proBNP is highly sensitive for the detection of acute congestive heart failure. In addition NT-proBNP of <300 pg/ml effectively rules out acute congestive heart failure with 99% negative predictive value.    Results may be falsely decreased if patient taking Biotin.     TROPONIN (IN-HOUSE) - Normal    Narrative:     Troponin T Reference Range:  <= 0.03 ng/mL-   Negative for AMI  >0.03 ng/mL-     Abnormal for myocardial necrosis.  Clinicians would have to utilize clinical acumen, EKG, Troponin and serial changes to determine if it is an Acute Myocardial Infarction or myocardial injury due to an underlying chronic condition.       Results may be falsely decreased if patient taking Biotin.     BLOOD CULTURE   BLOOD CULTURE   RAINBOW DRAW    Narrative:     The following orders were created for panel order [place] Draw.  Procedure                               " Abnormality         Status                     ---------                               -----------         ------                     Green Top (Gel)[[id number]]                                  Final result               Lavender Top[[id number]]                                     Final result               Gold Top - SST[[id number]]                                   Final result                 Please view results for these tests on the individual orders.   URINALYSIS W/ CULTURE IF INDICATED   LACTIC ACID, REFLEX   CBC AND DIFFERENTIAL    Narrative:     The following orders were created for panel order CBC & Differential.  Procedure                               Abnormality         Status                     ---------                               -----------         ------                     CBC Auto Differential[[id number]]        Abnormal            Final result                 Please view results for these tests on the individual orders.   GREEN TOP   LAVENDER TOP   GOLD TOP - SST       EKG:   ECG 12 Lead   Final Result   HEART RATE= 119  bpm   RR Interval= 505  ms   VT Interval=   ms   P Horizontal Axis=   deg   P Front Axis=   deg   QRSD Interval= 88  ms   QT Interval= 311  ms   QRS Axis= 59  deg   T Wave Axis= 73  deg   - ABNORMAL ECG -   Sinus tachycardia   Electronically Signed By: Sebas Leo ([facility]) 19-Aug-2021 16:07:58   Date and Time of Study: 2021-08-19 14:28:32          Meds given in ED:   Medications   sodium chloride 0.9 % flush 10 mL (has no administration in time range)   ampicillin-sulbactam (UNASYN) 3 g in sodium chloride 0.9 % 100 mL IVPB-VTB (0 g Intravenous Stopped 8/19/21 9265)   AZITHROMYCIN 500 MG/250 ML 0.9% NS IVPB (vial-mate) (500 mg Intravenous New Bag 8/19/21 1806)       Imaging results:  No radiology results for the last day    Ambulatory status:   -     Social issues:   Social History     Socioeconomic History   • Marital status: Single     Spouse name: Not on file   • Number of  children: Not on file   • Years of education: Not on file   • Highest education level: Not on file    Nursing report ED to floor       Krista Talavera, RN  08/19/21 2002      Electronically signed by Krista Talavera RN at 08/19/21 2002

## 2021-08-20 NOTE — PLAN OF CARE
Goal Outcome Evaluation:  Plan of Care Reviewed With: patient        Progress: no change  Outcome Summary: Bedside swallow evaluation completed. Per diet orders, pt does not have dentures in hospital with her. Lunch tray is present, pt with R hand contractures, self feeding puree tray. Oral phase functional, A-P transport timely with no oral residuals. Pt is A&Ox3, states she eats “most things,” without dentures present, does endorse difficulty with medications, claiming she crushes difficult meds and takes with water at home. Mastication for soft solids, regular solids timely. Min cough post prandial with regular solids, with minimal palatal residue remaining. Thin liquid wash clears entirely. Pharyngeal phase with no overt s/s aspiration, aside from cough ¼ opps regular solids. Digital palpation suggests timely initiation.     Recommend: soft whole solids and thin liquids. Medications: with thin liquids, crushed as needed (pt baseline). Compensations: small bites/sips, upright for all PO.     SLP to sign off at this time, as no skilled dysphagia needs indicated. Please re-consult or contact this department with changes in condition or if difficulties or s/s aspiration noted.

## 2021-08-20 NOTE — THERAPY EVALUATION
Patient Name: Loreto Green  : 1945    MRN: [id number]                              Today's Date: 2021       Admit Date: 2021    Visit Dx:     ICD-10-CM ICD-9-CM   1. Community acquired pneumonia of right upper lobe of lung  J18.9 486   2. Sepsis without acute organ dysfunction, due to unspecified organism (CMS/HCC)  A41.9 038.9     995.91     Patient Active Problem List   Diagnosis   • Community acquired pneumonia of right upper lobe of lung   • COVID-19 virus detected   • RSV infection   • Lung mass     Past Medical History:   Diagnosis Date   • Asthma    • Hepatitis B     Latent    • History of hepatitis C    • Rheumatoid arthritis (CMS/HCC)      Past Surgical History:   Procedure Laterality Date   • CHOLECYSTECTOMY     • JOINT REPLACEMENT      Left hip. left and right knee     General Information     Row Name 21 1126          Physical Therapy Time and Intention    Document Type  evaluation  -     Mode of Treatment  physical therapy;individual therapy  -MW     Row Name 21 1126          General Information    Patient Profile Reviewed  yes  -MW     Existing Precautions/Restrictions  fall  -MW     Row Name 21 1126          Home Main Entrance    Number of Stairs, Main Entrance  one  -MW     Row Name 21 1126          Cognition    Orientation Status (Cognition)  oriented x 4  -MW     Row Name 21 1126          Safety Issues, Functional Mobility    Impairments Affecting Function (Mobility)  balance;endurance/activity tolerance;shortness of breath;strength  -MW       User Key  (r) = Recorded By, (t) = Taken By, (c) = Cosigned By    Initials Name Provider Type    MW Roxie Tijerina PT Physical Therapist        Mobility     Row Name 21 1311          Bed Mobility    Bed Mobility  supine-sit-supine  -MW     All Activities, [place] (Bed Mobility)  moderate assist (50% patient effort);verbal cues  -MW     Assistive Device (Bed Mobility)  bed rails;head of bed  elevated  -MW     Comment (Bed Mobility)  Supine to EOB towards pt's left side  -MW     Row Name 08/20/21 1311          Sit-Stand Transfer    Sit-Stand [place] (Transfers)  contact guard;verbal cues  -MW     Assistive Device (Sit-Stand Transfers)  walker, front-wheeled  -MW     Row Name 08/20/21 1311          Gait/Stairs (Locomotion)    [place] Level (Gait)  minimum assist (75% patient effort);verbal cues  -MW     Assistive Device (Gait)  walker, front-wheeled  -MW     Distance in Feet (Gait)  18  -MW     Deviations/Abnormal Patterns (Gait)  left sided deviations;gait speed decreased;ataxic  -MW     Bilateral Gait Deviations  heel strike decreased  -MW     Left Sided Gait Deviations  weight shift ability decreased  -MW     Comment (Gait/Stairs)  Decreased WB at LLE due to weaness and h/o RA; A given with manuevering turns  -MW       User Key  (r) = Recorded By, (t) = Taken By, (c) = Cosigned By    Initials Name Provider Type    Roxie Pham PT Physical Therapist        Obj/Interventions     Row Name 08/20/21 1315          Range of Motion Comprehensive    General Range of Motion  bilateral lower extremity ROM WFL  -     Row Name 08/20/21 1315          Strength Comprehensive (MMT)    General Manual Muscle Testing (MMT) Assessment  lower extremity strength deficits identified  -     Row Name 08/20/21 1315          Balance    Balance Assessment  sitting static balance;standing static balance  -MW     Static Sitting Balance  WFL  -MW     Static Standing Balance  WFL;supported  -       User Key  (r) = Recorded By, (t) = Taken By, (c) = Cosigned By    Initials Name Provider Type    Roxie Pham PT Physical Therapist        Goals/Plan     Row Name 08/20/21 1322          Bed Mobility Goal 1 (PT)    Activity/Assistive Device (Bed Mobility Goal 1, PT)  bed mobility activities, all  -MW     [place] Level/Cues Needed (Bed Mobility Goal 1, PT)  contact guard assist;supervision required  -      Time Frame (Bed Mobility Goal 1, PT)  by discharge  -     Row Name 08/20/21 1322          Transfer Goal 1 (PT)    Activity/Assistive Device (Transfer Goal 1, PT)  transfers, all;walker, rolling;walker, standard  -MW     [place] Level/Cues Needed (Transfer Goal 1, PT)  contact guard assist;supervision required  -MW     Time Frame (Transfer Goal 1, PT)  by discharge  -     Row Name 08/20/21 1322          Gait Training Goal 1 (PT)    Activity/Assistive Device (Gait Training Goal 1, PT)  gait (walking locomotion);assistive device use;walker, rolling;walker, standard  -MW     [place] Level (Gait Training Goal 1, PT)  supervision required;contact guard assist  -MW     Distance (Gait Training Goal 1, PT)  50  -MW     Time Frame (Gait Training Goal 1, PT)  by discharge  -       User Key  (r) = Recorded By, (t) = Taken By, (c) = Cosigned By    Initials Name Provider Type    Roxie Pham, PT Physical Therapist        Clinical Impression     Row Name 08/20/21 131 08/20/21 1128       Pain    Additional Documentation  --  -MW  Pain Scale: Numbers Pre/Post-Treatment (Group)  -MW    Row Name 08/20/21 1315 08/20/21 1128       Pain Scale: Numbers Pre/Post-Treatment    Pretreatment Pain Rating  --  -MW  0/10 - no pain  -MW    Posttreatment Pain Rating  --  -MW  0/10 - no pain  -MW    Row Name 08/20/21 1315 08/20/21 1128       Plan of Care Review    Plan of Care Reviewed With  --  patient  -MW    Outcome Summary  Pt is a 75 yo female with h/o RA, latent Hep B, chronic Hep C, and h/o R humerus fx with deformity RUE admitted for pneumonia RUL, + COVID, and + RSV. She lives with her adult daughter in a home with 1 FAWN, uses SW for mobility at baseline. She currently presents with impaired functional mobility and activity tolerance. Today she was able to perform bed mobility with Mod A, STS with CGA at RW, and gait x 18 ft with RW Min A. Acute PT indicated to address functional deficits. At this time PT recs home  with A and HHPT after discharge from hospital pending continued progress.  -MW  --    Row Name 08/20/21 1315          Therapy Assessment/Plan (PT)    Rehab Potential (PT)  good, to achieve stated therapy goals  -MW     Criteria for Skilled Interventions Met (PT)  yes;meets criteria;skilled treatment is necessary  -MW     Predicted Duration of Therapy Intervention (PT)  5 days  -MW     Row Name 08/20/21 1315 08/20/21 1128       Vital Signs    Pre Systolic BP Rehab  --  129  -MW    Pre Treatment Diastolic BP  --  84  -MW    Pretreatment Heart Rate (beats/min)  --  78  -MW    Posttreatment Heart Rate (beats/min)  81  -MW  --    Pre SpO2 (%)  --  96  -MW    O2 Delivery Pre Treatment  room air  -MW  --    O2 Delivery Intra Treatment  --  room air  -MW    O2 Delivery Post Treatment  --  room air  -MW    Pre Patient Position  --  Supine  -MW    Intra Patient Position  Standing  -MW  --    Post Patient Position  Supine  -MW  --    Row Name 08/20/21 1315          Positioning and Restraints    Pre-Treatment Position  in bed  -MW     Post Treatment Position  bed  -MW     In Bed  supine;call light within reach;encouraged to call for assist;exit alarm on All lines intact, needs met  -       User Key  (r) = Recorded By, (t) = Taken By, (c) = Cosigned By    Initials Name Provider Type    Roxie Pham, PT Physical Therapist        Outcome Measures     Row Name 08/20/21 1323          How much help from another person do you currently need...    Turning from your back to your side while in flat bed without using bedrails?  3  -MW     Moving from lying on back to sitting on the side of a flat bed without bedrails?  2  -MW     Moving to and from a bed to a chair (including a wheelchair)?  3  -MW     Standing up from a chair using your arms (e.g., wheelchair, bedside chair)?  3  -MW     Climbing 3-5 steps with a railing?  2  -MW     To walk in hospital room?  3  -MW     AM-PAC 6 Clicks Score (PT)  16  -MW     Row Name 08/20/21  1323 08/20/21 1018       Functional Assessment    Outcome Measure Options  AM-PAC 6 Clicks Basic Mobility (PT)  -  AM-PAC 6 Clicks Daily Activity (OT)  -BT      User Key  (r) = Recorded By, (t) = Taken By, (c) = Cosigned By    Initials Name Provider Type    Roxie Pham, BERTHA Physical Therapist    BT Zaynab Burrell OT Occupational Therapist                       Physical Therapy Education                 Title: PT OT SLP Therapies (In Progress)     Topic: Physical Therapy (In Progress)     Point: Mobility training (Done)     Learning Progress Summary           Patient Acceptance, E, VU,NR by  at 8/20/2021 1324                   Point: Home exercise program (Not Started)     Learner Progress:  Not documented in this visit.          Point: Body mechanics (Not Started)     Learner Progress:  Not documented in this visit.          Point: Precautions (Not Started)     Learner Progress:  Not documented in this visit.                      User Key     Initials Effective Dates Name Provider Type Discipline     06/16/21 -  Roxie Tijerina PT Physical Therapist PT              PT Recommendation and Plan  Planned Therapy Interventions (PT): balance training, bed mobility training, gait training, home exercise program, patient/family education, postural re-education, stair training, strengthening, transfer training  Plan of Care Reviewed With: patient  Outcome Summary: Pt is a 75 yo female with h/o RA, latent Hep B, chronic Hep C, and h/o R humerus fx with deformity RUE admitted for pneumonia RUL, + COVID, and + RSV. She lives with her adult daughter in a home with 1 FAWN, uses SW for mobility at baseline. She currently presents with impaired functional mobility and activity tolerance. Today she was able to perform bed mobility with Mod A, STS with CGA at RW, and gait x 18 ft with RW Min A. Acute PT indicated to address functional deficits. At this time PT recs home with A and HHPT after discharge from hospital pending  continued progress.     Time Calculation:   PT Charges     Row Name 08/20/21 1325 08/20/21 1128          Time Calculation    Start Time  1117  -MW  --     Stop Time  1146  -MW  --     Time Calculation (min)  29 min  -MW  --     PT Received On  --  08/20/21  -MW     PT - Next Appointment  08/23/21  -MW  --     PT Goal Re-Cert Due Date  08/27/21  -MW  --        Time Calculation- PT    Total Timed Code Minutes- PT  26 minute(s)  -MW  --       User Key  (r) = Recorded By, (t) = Taken By, (c) = Cosigned By    Initials Name Provider Type    Roxie Pham PT Physical Therapist        Therapy Charges for Today     Code Description Service Date Service Provider Modifiers Qty    39760873526 HC PT EVAL LOW COMPLEXITY 2 8/20/2021 Roxie Tijerina, PT GP 1    53278027408 HC PT THER PROC EA 15 MIN 8/20/2021 Roxie Tijerina, PT GP 1    49030620587 HC PT THERAPEUTIC ACT EA 15 MIN 8/20/2021 Roxie Tijerina, PT GP 1          PT G-Codes  Outcome Measure Options: AM-PAC 6 Clicks Basic Mobility (PT)  AM-PAC 6 Clicks Score (PT): 16  AM-PAC 6 Clicks Score (OT): 16    Roxie Tijerina PT  8/20/2021

## 2021-08-20 NOTE — PLAN OF CARE
Goal Outcome Evaluation:  Plan of Care Reviewed With: patient           Outcome Summary: Pt is a 75 yo female with h/o RA, latent Hep B, chronic Hep C, and h/o R humerus fx with deformity RUE admitted for pneumonia RUL, + COVID, and + RSV. She lives with her adult daughter in a home with 1 FAWN, uses SW for mobility at baseline. She currently presents with impaired functional mobility and activity tolerance. Today she was able to perform bed mobility with Mod A, STS with CGA at RW, and gait x 18 ft with RW Min A. Acute PT indicated to address functional deficits. At this time PT recs home with A and HHPT after discharge from hospital pending continued progress.  ..Patient was intermittently wearing a face mask during this therapy encounter. Therapist used appropriate personal protective equipment including gown, eye protection, mask and gloves.  Mask used was N95/duckbill. Appropriate PPE was worn during the entire therapy session. Hand hygiene was completed before and after therapy session. Patient is in enhanced droplet precautions.

## 2021-08-20 NOTE — PLAN OF CARE
Goal Outcome Evaluation:  Plan of Care Reviewed With: patient           Outcome Summary: Pt is a 77 yo female admitted with generalized weakness, cough, SOA, and N&V. Pt found to be positive for Covid and RSV. Pt A&O x 4 and reports being independent with adls and functional transfers at baseline. On this date, pt required mod A for bed mobility, CGA for STS transfer, and min A for side steps towards HOB. Pt completed grooming task at EOB with SBA and dep for LB adls on this date due to fatigue. Pt states she is typically able to don/doff socks independently with a sock aide. Pt demo'd decreased strength and activity tolerance requiring moderate rest breaks after about 1 minute of static standing. Pt would benefit from skilled OT services to address these deficits. DC recs include [facility] vs HH pending pt progress.    Pt wore face mask. OT wore all PPE and hand hygiene completed before and after.

## 2021-08-21 LAB
ALBUMIN SERPL-MCNC: 1.8 G/DL (ref 3.5–5.2)
ALBUMIN/GLOB SERPL: 0.4 G/DL
ALP SERPL-CCNC: 77 U/L (ref 39–117)
ALT SERPL W P-5'-P-CCNC: 39 U/L (ref 1–33)
ANION GAP SERPL CALCULATED.3IONS-SCNC: 9 MMOL/L (ref 5–15)
AST SERPL-CCNC: 47 U/L (ref 1–32)
BILIRUB SERPL-MCNC: 0.5 MG/DL (ref 0–1.2)
BUN SERPL-MCNC: 16 MG/DL (ref 8–23)
BUN/CREAT SERPL: 31.4 (ref 7–25)
CALCIUM SPEC-SCNC: 8 MG/DL (ref 8.6–10.5)
CHLORIDE SERPL-SCNC: 111 MMOL/L (ref 98–107)
CO2 SERPL-SCNC: 19 MMOL/L (ref 22–29)
CREAT SERPL-MCNC: 0.51 MG/DL (ref 0.57–1)
CRP SERPL-MCNC: 11.2 MG/DL (ref 0–0.5)
DEPRECATED RDW RBC AUTO: 40.7 FL (ref 37–54)
ERYTHROCYTE [DISTWIDTH] IN BLOOD BY AUTOMATED COUNT: 12.1 % (ref 12.3–15.4)
GFR SERPL CREATININE-BSD FRML MDRD: 143 ML/MIN/1.73
GLOBULIN UR ELPH-MCNC: 4.3 GM/DL
GLUCOSE SERPL-MCNC: 82 MG/DL (ref 65–99)
HCT VFR BLD AUTO: 32.4 % (ref 34–46.6)
HGB BLD-MCNC: 10.3 G/DL (ref 12–15.9)
MCH RBC QN AUTO: 29.7 PG (ref 26.6–33)
MCHC RBC AUTO-ENTMCNC: 31.8 G/DL (ref 31.5–35.7)
MCV RBC AUTO: 93.4 FL (ref 79–97)
PLATELET # BLD AUTO: 369 10*3/MM3 (ref 140–450)
PMV BLD AUTO: 10.5 FL (ref 6–12)
POTASSIUM SERPL-SCNC: 3.7 MMOL/L (ref 3.5–5.2)
PROCALCITONIN SERPL-MCNC: 1.47 NG/ML (ref 0–0.25)
PROT SERPL-MCNC: 6.1 G/DL (ref 6–8.5)
RBC # BLD AUTO: 3.47 10*6/MM3 (ref 3.77–5.28)
SODIUM SERPL-SCNC: 139 MMOL/L (ref 136–145)
WBC # BLD AUTO: 7.2 10*3/MM3 (ref 3.4–10.8)

## 2021-08-21 PROCEDURE — 25010000002 ENOXAPARIN PER 10 MG: Performed by: INTERNAL MEDICINE

## 2021-08-21 PROCEDURE — 94799 UNLISTED PULMONARY SVC/PX: CPT

## 2021-08-21 PROCEDURE — 94664 DEMO&/EVAL PT USE INHALER: CPT

## 2021-08-21 PROCEDURE — 80053 COMPREHEN METABOLIC PANEL: CPT | Performed by: HOSPITALIST

## 2021-08-21 PROCEDURE — 86140 C-REACTIVE PROTEIN: CPT | Performed by: HOSPITALIST

## 2021-08-21 PROCEDURE — 85027 COMPLETE CBC AUTOMATED: CPT | Performed by: HOSPITALIST

## 2021-08-21 PROCEDURE — 84145 PROCALCITONIN (PCT): CPT | Performed by: HOSPITALIST

## 2021-08-21 PROCEDURE — 25010000003 AMPICILLIN-SULBACTAM PER 1.5 G: Performed by: HOSPITALIST

## 2021-08-21 RX ADMIN — ALBUTEROL SULFATE 2 PUFF: 90 AEROSOL, METERED RESPIRATORY (INHALATION) at 11:05

## 2021-08-21 RX ADMIN — ALBUTEROL SULFATE 2 PUFF: 90 AEROSOL, METERED RESPIRATORY (INHALATION) at 19:29

## 2021-08-21 RX ADMIN — AMPICILLIN SODIUM AND SULBACTAM SODIUM 3 G: 2; 1 INJECTION, POWDER, FOR SOLUTION INTRAMUSCULAR; INTRAVENOUS at 09:37

## 2021-08-21 RX ADMIN — AMPICILLIN SODIUM AND SULBACTAM SODIUM 3 G: 2; 1 INJECTION, POWDER, FOR SOLUTION INTRAMUSCULAR; INTRAVENOUS at 15:52

## 2021-08-21 RX ADMIN — AMPICILLIN SODIUM AND SULBACTAM SODIUM 3 G: 2; 1 INJECTION, POWDER, FOR SOLUTION INTRAMUSCULAR; INTRAVENOUS at 21:59

## 2021-08-21 RX ADMIN — AMPICILLIN SODIUM AND SULBACTAM SODIUM 3 G: 2; 1 INJECTION, POWDER, FOR SOLUTION INTRAMUSCULAR; INTRAVENOUS at 04:36

## 2021-08-21 RX ADMIN — SODIUM CHLORIDE 75 ML/HR: 9 INJECTION, SOLUTION INTRAVENOUS at 15:52

## 2021-08-21 RX ADMIN — SODIUM CHLORIDE, PRESERVATIVE FREE 10 ML: 5 INJECTION INTRAVENOUS at 09:38

## 2021-08-21 RX ADMIN — SODIUM CHLORIDE, PRESERVATIVE FREE 10 ML: 5 INJECTION INTRAVENOUS at 22:00

## 2021-08-21 RX ADMIN — ALBUTEROL SULFATE 2 PUFF: 90 AEROSOL, METERED RESPIRATORY (INHALATION) at 15:47

## 2021-08-21 RX ADMIN — ENOXAPARIN SODIUM 40 MG: 40 INJECTION SUBCUTANEOUS at 09:37

## 2021-08-21 RX ADMIN — ALBUTEROL SULFATE 2 PUFF: 90 AEROSOL, METERED RESPIRATORY (INHALATION) at 07:09

## 2021-08-21 NOTE — PLAN OF CARE
Goal Outcome Evaluation:  Plan of Care Reviewed With: patient        Progress: no change  Outcome Summary: Unasyn continues.  Patient productive cough noted with clear sputum.  Patient appetite fair and encouragement to eat with meals set up.  Lovenox therapy continues.  No sticks BPs in right arm.  VS and labs monitored.

## 2021-08-21 NOTE — PROGRESS NOTES
Name: Loreto Green ADMIT: 2021   : 1945  PCP: Provider, No Known    MRN: [id number] LOS: 2 days   AGE/SEX: 76 y.o. female  ROOM: [facility]     Subjective   Subjective   No complaints, patient says she feels about the same.  Complains of severe weakness    Review of Systems   Respiratory: Positive for shortness of breath.    Gastrointestinal: Negative for diarrhea and nausea.        Objective   Objective   Vital Signs  Temp:  [97.9 °F (36.6 °C)-98.5 °F (36.9 °C)] 98.5 °F (36.9 °C)  Heart Rate:  [77-89] 81  Resp:  [18-20] 20  BP: ()/(61-93) 128/73  SpO2:  [94 %-98 %] 96 %  on   ;   Device (Oxygen Therapy): room air  Body mass index is 26.99 kg/m².  Physical Exam  Vitals and nursing note reviewed.   Constitutional:       General: She is not in acute distress.     Appearance: Normal appearance. She is not ill-appearing.      Comments: Thin and frail appearing   HENT:      Head: Normocephalic and atraumatic.      Mouth/Throat:      Mouth: Mucous membranes are moist.   Eyes:      General: No scleral icterus.  Neck:      Vascular: No JVD.   Cardiovascular:      Rate and Rhythm: Normal rate and regular rhythm.      Pulses: Normal pulses.      Heart sounds: Normal heart sounds. No murmur heard.     Pulmonary:      Effort: Pulmonary effort is normal. No respiratory distress.      Breath sounds: Rhonchi present.   Abdominal:      General: Bowel sounds are normal. There is no distension.      Palpations: Abdomen is soft.      Tenderness: There is no abdominal tenderness.   Musculoskeletal:         General: Deformity (Ulnar deviation of the fingers) present. No swelling or tenderness.      Cervical back: Neck supple.   Skin:     General: Skin is warm and dry.      Coloration: Skin is not jaundiced.      Findings: No rash.   Neurological:      General: No focal deficit present.      Mental Status: She is alert and oriented to person, place, and time.   Psychiatric:         Mood and Affect: Mood normal.          Behavior: Behavior normal.         Results Review     I reviewed the patient's new clinical results.  Results from last 7 days   Lab Units 08/21/21  0706 08/20/21  0851 08/19/21  1501   WBC 10*3/mm3 7.20 8.06 14.62*   HEMOGLOBIN g/dL 10.3* 10.8* 14.8   PLATELETS 10*3/mm3 369 388 442     Results from last 7 days   Lab Units 08/21/21  0706 08/20/21  0851 08/19/21  1617   SODIUM mmol/L 139 144 144   POTASSIUM mmol/L 3.7 3.4* 3.6   CHLORIDE mmol/L 111* 110* 106   CO2 mmol/L 19.0* 23.3 25.6   BUN mg/dL 16 33* 38*   CREATININE mg/dL 0.51* 0.68 0.94   GLUCOSE mg/dL 82 83 96   Estimated Creatinine Clearance: 53.2 mL/min (A) (by C-G formula based on SCr of 0.51 mg/dL (L)).  Results from last 7 days   Lab Units 08/21/21  0706 08/20/21  0851 08/19/21  1617   ALBUMIN g/dL 1.80* 2.20* 3.40*   BILIRUBIN mg/dL 0.5 0.7 1.4*   ALK PHOS U/L 77 76 105   AST (SGOT) U/L 47* 88* 111*   ALT (SGPT) U/L 39* 53* 66*     Results from last 7 days   Lab Units 08/21/21  0706 08/20/21  0851 08/19/21  1617   CALCIUM mg/dL 8.0* 8.1* 9.7   ALBUMIN g/dL 1.80* 2.20* 3.40*     Results from last 7 days   Lab Units 08/21/21  0706 08/20/21  0851 08/19/21  2233 08/19/21  1552 08/19/21  1501   PROCALCITONIN ng/mL 1.47* 2.92*  --   --  4.80*   LACTATE mmol/L  --   --  2.0 3.0*  --      COVID19   Date Value Ref Range Status   08/19/2021 Detected (C) Not Detected - Ref. Range Final     No results found for: HGBA1C, POCGLU    CT Chest Without Contrast Diagnostic  CT CHEST WITHOUT CONTRAST DIAGNOSTIC     Radiation dose reduction techniques were utilized, including automated  exposure control and exposure modulation based on body size.     CLINICAL INFORMATION: Lung mass.     Correlation with chest radiograph 08/19/2021.     FINDINGS: There are patchy multifocal areas of ground glass infiltrate  demonstrated within the right and left lung periphery. This is involving  the right upper lobe to the greatest degree where there is associated  areas of  consolidation. In the left lower lobe on image #59 there is a 5  mm noncalcified pulmonary nodule. No pleural effusion identified.  Cardiac size upper limits of normal, there is coronary artery  calcification with atherosclerotic calcification of the aorta. There are  several mildly enlarged mediastinal lymph nodes seen, reference node in  the precarinal space is 11 mm short axis. Very small lymph nodes  demonstrated within the right supraclavicular region and thoracic inlet.  Left axillary lymph nodes are normal in appearance, the breast  parenchyma is symmetric and satisfactory in appearance. There is a  solitary enlarged pathologic right axillary lymph node measuring 17 x 13  mm. This is likely secondary to the right elbow joint replacement.     CONCLUSION: Patchy multifocal areas of infiltrate demonstrated within  the periphery of both lungs, most pronounced in the right upper lobe  where there are areas of consolidation. The overall appearance is most  worrisome for underlying COVID pneumonia. Other atypical viral pneumonia  not excluded. Enlarged mediastinal and right axillary nodes as described  above.        This report was finalized on 8/20/2021 2:46 PM by Dr. Nirav Mitchell M.D.       Scheduled Medications  albuterol sulfate HFA, 2 puff, Inhalation, 4x Daily - RT  ampicillin-sulbactam, 3 g, Intravenous, Q6H  enoxaparin, 40 mg, Subcutaneous, Q24H  sodium chloride, 10 mL, Intravenous, Q12H    Infusions  sodium chloride, 75 mL/hr, Last Rate: 75 mL/hr (08/21/21 1552)    Diet  Diet Soft Texture; Whole Foods; Thin       Assessment/Plan     Active Hospital Problems    Diagnosis  POA   • **Pneumonia due to COVID-19 virus [U07.1, J12.82]  Yes   • Hypokalemia [E87.6]  Unknown   • Rheumatoid arthritis involving multiple sites (CMS/HCC) [M06.9]  Unknown   • Immunosuppression (CMS/HCC) [D84.9]  Unknown   • RSV infection [B97.4]  Unknown      Resolved Hospital Problems   No resolved problems to display.       76 y.o.  female unvaccinated against COVID-19, with history of rheumatoid arthritis on Enbrel admitted with Pneumonia due to COVID-19 virus.      · Pneumonia probably secondary to COVID-19.  It does have an unusual appearance on chest x-ray but CT was suggestive of multifocal pneumonia more along the lines of Covid pneumonia.  However procalcitonin is quite elevated and so I am treating for potential superimposed bacterial infection though blood culture is negative  · Would consider repeating the CT scan in 4 to 6 weeks after treatment for pneumonia  · Will not start her on dexamethasone due to this question of bacterial superinfection as well as lack of hypoxia.  Also not really a candidate for remdesivir without hypoxia  · Patient definitely needs to be vaccinated for Covid after discharge  · Outpatient follow-up with orthopedics regarding the right upper arm hardware.  She has apparently seen them for this before      · Lovenox 40 mg SC daily for DVT prophylaxis.  · Dispo: Likely could go home soon but still looked pretty frail and not quite ready to go yet.  I would suspect another day or 2.  Lives with her daughter    Orlando Saenz MD [place]  [facility]  08/21/21  17:10 EDT

## 2021-08-21 NOTE — PLAN OF CARE
"Goal Outcome Evaluation:              Outcome Summary: Pt complained of being \"sore\" from coughing. IV ampicillin given. NS at 75. Remains on room air. VSS.  Will continue to monitor.  "

## 2021-08-22 PROCEDURE — 25010000003 AMPICILLIN-SULBACTAM PER 1.5 G: Performed by: HOSPITALIST

## 2021-08-22 PROCEDURE — 25010000002 ENOXAPARIN PER 10 MG: Performed by: INTERNAL MEDICINE

## 2021-08-22 PROCEDURE — 94760 N-INVAS EAR/PLS OXIMETRY 1: CPT

## 2021-08-22 PROCEDURE — 94799 UNLISTED PULMONARY SVC/PX: CPT

## 2021-08-22 RX ADMIN — AMPICILLIN SODIUM AND SULBACTAM SODIUM 3 G: 2; 1 INJECTION, POWDER, FOR SOLUTION INTRAMUSCULAR; INTRAVENOUS at 15:26

## 2021-08-22 RX ADMIN — SODIUM CHLORIDE, PRESERVATIVE FREE 10 ML: 5 INJECTION INTRAVENOUS at 09:31

## 2021-08-22 RX ADMIN — ALBUTEROL SULFATE 2 PUFF: 90 AEROSOL, METERED RESPIRATORY (INHALATION) at 08:46

## 2021-08-22 RX ADMIN — AMPICILLIN SODIUM AND SULBACTAM SODIUM 3 G: 2; 1 INJECTION, POWDER, FOR SOLUTION INTRAMUSCULAR; INTRAVENOUS at 03:07

## 2021-08-22 RX ADMIN — AMPICILLIN SODIUM AND SULBACTAM SODIUM 3 G: 2; 1 INJECTION, POWDER, FOR SOLUTION INTRAMUSCULAR; INTRAVENOUS at 09:31

## 2021-08-22 RX ADMIN — ALBUTEROL SULFATE 2 PUFF: 90 AEROSOL, METERED RESPIRATORY (INHALATION) at 19:57

## 2021-08-22 RX ADMIN — ALBUTEROL SULFATE 2 PUFF: 90 AEROSOL, METERED RESPIRATORY (INHALATION) at 16:25

## 2021-08-22 RX ADMIN — AMPICILLIN SODIUM AND SULBACTAM SODIUM 3 G: 2; 1 INJECTION, POWDER, FOR SOLUTION INTRAMUSCULAR; INTRAVENOUS at 21:27

## 2021-08-22 RX ADMIN — ENOXAPARIN SODIUM 40 MG: 40 INJECTION SUBCUTANEOUS at 09:31

## 2021-08-22 RX ADMIN — SODIUM CHLORIDE, PRESERVATIVE FREE 10 ML: 5 INJECTION INTRAVENOUS at 21:27

## 2021-08-22 NOTE — PLAN OF CARE
Goal Outcome Evaluation:  Plan of Care Reviewed With: patient        Progress: no change  Outcome Summary: Unasyn continues.  Patient poor appetite and meals set up to encourage patient to eat.  No BPs / sticks in right arm.  Lovenox therapy.  VS and labs monitored.

## 2021-08-22 NOTE — PROGRESS NOTES
[facility]     Progress Note    Patient Name: Loreto Green  : 1945  MRN: [id number]  Primary Care Physician:  Provider, No Known  Date of admission: 2021    Subjective   Subjective     Chief Complaint: covid    History of Present Illness  Patient Reports doing well, continue IS, coughing up clear sputum  Denies N/v/d tolerating oral intake    Review of Systems   Constitutional: Negative for activity change and appetite change.   Respiratory: Negative for shortness of breath.    Cardiovascular: Negative for chest pain.   Psychiatric/Behavioral: Negative for agitation and behavioral problems.       Objective   Objective     Vitals:   Temp:  [98.5 °F (36.9 °C)-99.8 °F (37.7 °C)] 98.7 °F (37.1 °C)  Heart Rate:  [81-90] 86  Resp:  [18-20] 18  BP: (119-138)/(66-76) 138/72    Physical Exam  HENT:      Head: Normocephalic and atraumatic.   Cardiovascular:      Heart sounds: No murmur heard.   No friction rub.   Pulmonary:      Effort: Pulmonary effort is normal.      Breath sounds: Normal breath sounds.   Abdominal:      General: Bowel sounds are normal. There is no distension.      Palpations: Abdomen is soft.      Tenderness: There is no abdominal tenderness.   Skin:     General: Skin is warm and dry.          Result Review    Result Review:  I have personally reviewed the results from the time of this admission to 2021 11:10 EDT and agree with these findings:  [x]  Laboratory  [x]  Microbiology  [x]  Radiology  [x]  EKG/Telemetry   []  Cardiology/Vascular   []  Pathology  [x]  Old records  []  Other:  Most notable findings include: Extensive infiltrate on CT scan    Assessment/Plan   Assessment / Plan     Brief Patient Summary:  Loreto Green is a 76 y.o. female who rheumatoid arthritis and immunocompromise secondary to Enbrel    Active Hospital Problems:  Active Hospital Problems    Diagnosis    • **Pneumonia due to COVID-19 virus    • Hypokalemia    • Rheumatoid arthritis involving  multiple sites (CMS/[facility])    • Immunosuppression (CMS/[facility])    • RSV infection      Plan:   Likely secondary infection to COVID-19, continue with IV antibiotics.  Extensive infiltrate  We will ask infectious disease to reevaluate patient  We will need repeat scan in 4 to 6 weeks  Given no hypoxemia will hold on dexamethasone and remdesivir  Follow-up orthopedics outpatient  Patient worked with physical therapy walked 18 feet on 8/20  Speech therapy note reviewed  Discussed with nurse    DVT prophylaxis:  Medical and mechanical DVT prophylaxis orders are present.    CODE STATUS:    Code Status: CPR  Medical Interventions (Level of Support Prior to Arrest): Full    Disposition:  I expect patient to be discharged 1-2days.    >35 minutes spent, > 1/2 time spent counseling and coordination of care on covid      Electronically signed by Archie Craig MD, 08/22/21, 11:10 AM EDT.

## 2021-08-23 LAB
ALBUMIN SERPL-MCNC: 2 G/DL (ref 3.5–5.2)
ALBUMIN/GLOB SERPL: 0.5 G/DL
ALP SERPL-CCNC: 76 U/L (ref 39–117)
ALT SERPL W P-5'-P-CCNC: 29 U/L (ref 1–33)
ANION GAP SERPL CALCULATED.3IONS-SCNC: 11.1 MMOL/L (ref 5–15)
AST SERPL-CCNC: 32 U/L (ref 1–32)
BILIRUB SERPL-MCNC: 0.4 MG/DL (ref 0–1.2)
BUN SERPL-MCNC: 6 MG/DL (ref 8–23)
BUN/CREAT SERPL: 11.8 (ref 7–25)
CALCIUM SPEC-SCNC: 7.6 MG/DL (ref 8.6–10.5)
CHLORIDE SERPL-SCNC: 108 MMOL/L (ref 98–107)
CO2 SERPL-SCNC: 20.9 MMOL/L (ref 22–29)
CREAT SERPL-MCNC: 0.51 MG/DL (ref 0.57–1)
CRP SERPL-MCNC: 8.19 MG/DL (ref 0–0.5)
DEPRECATED RDW RBC AUTO: 41.2 FL (ref 37–54)
EOSINOPHIL # BLD MANUAL: 0.1 10*3/MM3 (ref 0–0.4)
EOSINOPHIL NFR BLD MANUAL: 1 % (ref 0.3–6.2)
ERYTHROCYTE [DISTWIDTH] IN BLOOD BY AUTOMATED COUNT: 12.2 % (ref 12.3–15.4)
GFR SERPL CREATININE-BSD FRML MDRD: 143 ML/MIN/1.73
GLOBULIN UR ELPH-MCNC: 4.1 GM/DL
GLUCOSE SERPL-MCNC: 115 MG/DL (ref 65–99)
HCT VFR BLD AUTO: 34.5 % (ref 34–46.6)
HGB BLD-MCNC: 11 G/DL (ref 12–15.9)
LYMPHOCYTES # BLD MANUAL: 1.34 10*3/MM3 (ref 0.7–3.1)
LYMPHOCYTES NFR BLD MANUAL: 13.1 % (ref 19.6–45.3)
LYMPHOCYTES NFR BLD MANUAL: 8.1 % (ref 5–12)
MCH RBC QN AUTO: 29.7 PG (ref 26.6–33)
MCHC RBC AUTO-ENTMCNC: 31.9 G/DL (ref 31.5–35.7)
MCV RBC AUTO: 93.2 FL (ref 79–97)
MONOCYTES # BLD AUTO: 0.83 10*3/MM3 (ref 0.1–0.9)
NEUTROPHILS # BLD AUTO: 7.97 10*3/MM3 (ref 1.7–7)
NEUTROPHILS NFR BLD MANUAL: 77.8 % (ref 42.7–76)
PLAT MORPH BLD: NORMAL
PLATELET # BLD AUTO: 582 10*3/MM3 (ref 140–450)
PMV BLD AUTO: 9.8 FL (ref 6–12)
POTASSIUM SERPL-SCNC: 3.2 MMOL/L (ref 3.5–5.2)
PROT SERPL-MCNC: 6.1 G/DL (ref 6–8.5)
RBC # BLD AUTO: 3.7 10*6/MM3 (ref 3.77–5.28)
RBC MORPH BLD: NORMAL
SODIUM SERPL-SCNC: 140 MMOL/L (ref 136–145)
WBC # BLD AUTO: 10.24 10*3/MM3 (ref 3.4–10.8)
WBC MORPH BLD: NORMAL

## 2021-08-23 PROCEDURE — 85025 COMPLETE CBC W/AUTO DIFF WBC: CPT | Performed by: HOSPITALIST

## 2021-08-23 PROCEDURE — 25010000002 ENOXAPARIN PER 10 MG: Performed by: INTERNAL MEDICINE

## 2021-08-23 PROCEDURE — 80053 COMPREHEN METABOLIC PANEL: CPT | Performed by: HOSPITALIST

## 2021-08-23 PROCEDURE — 86140 C-REACTIVE PROTEIN: CPT | Performed by: HOSPITALIST

## 2021-08-23 PROCEDURE — 25010000003 AMPICILLIN-SULBACTAM PER 1.5 G: Performed by: HOSPITALIST

## 2021-08-23 PROCEDURE — 85007 BL SMEAR W/DIFF WBC COUNT: CPT | Performed by: HOSPITALIST

## 2021-08-23 PROCEDURE — 94799 UNLISTED PULMONARY SVC/PX: CPT

## 2021-08-23 RX ORDER — POTASSIUM CHLORIDE 1.5 G/1.77G
40 POWDER, FOR SOLUTION ORAL ONCE
Status: COMPLETED | OUTPATIENT
Start: 2021-08-24 | End: 2021-08-23

## 2021-08-23 RX ADMIN — AMPICILLIN SODIUM AND SULBACTAM SODIUM 3 G: 2; 1 INJECTION, POWDER, FOR SOLUTION INTRAMUSCULAR; INTRAVENOUS at 03:34

## 2021-08-23 RX ADMIN — POTASSIUM CHLORIDE 40 MEQ: 1.5 POWDER, FOR SOLUTION ORAL at 23:59

## 2021-08-23 RX ADMIN — AMPICILLIN SODIUM AND SULBACTAM SODIUM 3 G: 2; 1 INJECTION, POWDER, FOR SOLUTION INTRAMUSCULAR; INTRAVENOUS at 08:47

## 2021-08-23 RX ADMIN — ENOXAPARIN SODIUM 40 MG: 40 INJECTION SUBCUTANEOUS at 08:47

## 2021-08-23 RX ADMIN — SODIUM CHLORIDE, PRESERVATIVE FREE 10 ML: 5 INJECTION INTRAVENOUS at 08:50

## 2021-08-23 RX ADMIN — ALBUTEROL SULFATE 2 PUFF: 90 AEROSOL, METERED RESPIRATORY (INHALATION) at 11:59

## 2021-08-23 RX ADMIN — SODIUM CHLORIDE, PRESERVATIVE FREE 10 ML: 5 INJECTION INTRAVENOUS at 21:13

## 2021-08-23 RX ADMIN — ALBUTEROL SULFATE 2 PUFF: 90 AEROSOL, METERED RESPIRATORY (INHALATION) at 08:42

## 2021-08-23 RX ADMIN — AMPICILLIN SODIUM AND SULBACTAM SODIUM 3 G: 2; 1 INJECTION, POWDER, FOR SOLUTION INTRAMUSCULAR; INTRAVENOUS at 21:12

## 2021-08-23 RX ADMIN — ALBUTEROL SULFATE 2 PUFF: 90 AEROSOL, METERED RESPIRATORY (INHALATION) at 16:53

## 2021-08-23 RX ADMIN — AMPICILLIN SODIUM AND SULBACTAM SODIUM 3 G: 2; 1 INJECTION, POWDER, FOR SOLUTION INTRAMUSCULAR; INTRAVENOUS at 16:06

## 2021-08-23 NOTE — PROGRESS NOTES
[facility]     Progress Note    Patient Name: Loreto Green  : 1945  MRN: [id number]  Primary Care Physician:  Provider, No Known  Date of admission: 2021    Subjective   Subjective     Chief Complaint: covid    Weakness - Generalized  Pertinent negatives include no chest pain.     Patient Reports doing well, continue IS, coughing up clear sputum  Denies N/v/d tolerating oral intake    Review of Systems   Constitutional: Negative for activity change and appetite change.   Respiratory: Negative for shortness of breath.    Cardiovascular: Negative for chest pain.   Psychiatric/Behavioral: Negative for agitation and behavioral problems.       Objective   Objective     Vitals:   Temp:  [98 °F (36.7 °C)-98.6 °F (37 °C)] 98.6 °F (37 °C)  Heart Rate:  [78-91] 82  Resp:  [16-18] 18  BP: (118-158)/(71-88) 134/80    Physical Exam  HENT:      Head: Normocephalic and atraumatic.   Cardiovascular:      Heart sounds: No murmur heard.   No friction rub.   Pulmonary:      Effort: Pulmonary effort is normal.      Breath sounds: Normal breath sounds.   Abdominal:      General: Bowel sounds are normal. There is no distension.      Palpations: Abdomen is soft.      Tenderness: There is no abdominal tenderness.   Skin:     General: Skin is warm and dry.          Result Review    Result Review:  I have personally reviewed the results from the time of this admission to 2021 18:12 EDT and agree with these findings:  [x]  Laboratory  [x]  Microbiology  [x]  Radiology  [x]  EKG/Telemetry   []  Cardiology/Vascular   []  Pathology  [x]  Old records  []  Other:  Most notable findings include: Extensive infiltrate on CT scan    Assessment/Plan   Assessment / Plan     Brief Patient Summary:  Loreto Green is a 76 y.o. female who rheumatoid arthritis and immunocompromise secondary to Enbrel    Active Hospital Problems:  Active Hospital Problems    Diagnosis    • **Pneumonia due to COVID-19 virus    • Hypokalemia    •  Rheumatoid arthritis involving multiple sites (CMS/[facility])    • Immunosuppression (CMS/[facility])    • RSV infection      Plan:   Likely secondary infection to COVID-19, continue with IV antibiotics.  Extensive infiltrate  Discussed with infectious disease  will need repeat scan in 4 to 6 weeks  Given no hypoxemia will hold on dexamethasone and remdesivir  Follow-up orthopedics outpatient  Patient worked with physical therapy walked 18 feet on 8/20  Speech therapy note reviewed  Discussed with nurse    DVT prophylaxis:  Medical and mechanical DVT prophylaxis orders are present.    CODE STATUS:    Code Status: CPR  Medical Interventions (Level of Support Prior to Arrest): Full    Disposition:  I expect patient to be discharged 1-2days.    Electronically signed by Archie Craig MD, 08/22/21, 11:10 AM EDT.

## 2021-08-23 NOTE — CONSULTS
CONSULT NOTE    Infectious Diseases - Yobany Gutiérrez MD  [facility]       Patient Identification:  Name: Loreto Green  Age: 76 y.o.  Sex: female  :  1945  MRN: [id number]             Date of Consultation: 2021      Primary Care Physician: Provider, No Known                               Requesting Physician: Dr. Craig  Reason for Consultation: Pneumonia in a patient with immunocompromise with multiple vital respiratory tract infections including COVID-19 and RSV.    Impression: 76-year-old female who was admitted on 2021 at the [facility] internal medicine service when she presented with progressive weakness for a week and a half duration after being exposed to Covid.  Work-up revealed right upper lung infiltrate and concerning for mass along with respiratory viral panel for positive for COVID-19 and RSV.  Because of the right lung infiltrate and concern for lung mass patient was started on IV antibiotics-IV Unasyn with plans to repeat CT scan of the chest for further work-up of suspected mass.  Patient was able to maintain room air oxygen saturation greater than 94% and did not require any oxygen supplementation.  Patient continues to feel better and tolerating antibiotics properly.  CT scan of the chest did not show any evidence of malignancy extrapulmonary infiltrate concerning for COVID-19 pneumonia with secondary bacterial pneumonia.  This presentation is consistent with:  1-right upper lung bacterial pneumonia improving on antibiotic therapy  2-dual respiratory viral infection consisting of COVID-19 and RSV with no significant respiratory compromise and able to maintain oxygen saturation greater than 94% on room air  3-immunocompromised host with history of rheumatoid arthritis  4-history of hypertension.  5-deformity of right upper extremity      Recommendations/Discussions:  It appears patient has done well with initial management plan at the time of admission.   Would recommend continuation of IV Unasyn and changed to oral Augmentin to complete the treatment.  Since she is not hypoxic and doing well she does not need any Covid specific treatment.  Agree with plans for repeating imaging studies of her chest in few months and may benefit from pulmonary evaluation so that she can have a plan for follow-up on lung findings going forward.  Would recommend 7 days of antibiotic treatment.  While closely monitoring her for oxygen needs.  Thank you Dr. Craig for letting me be the part of your patient care please see above impression and recommendations    History of Present Illness:   76-year-old female who was admitted on 8/19/2021 at the [facility] for internal medicine service when she presented with progressive weakness for a week and a half duration after being exposed to Covid.  Work-up revealed right upper lung infiltrate and concerning for mass along with respiratory viral panel for positive for COVID-19 and RSV.  Because of the right lung infiltrate and concern for lung mass patient was started on IV antibiotics-IV Unasyn with plans to repeat CT scan of the chest for further work-up of suspected mass.  Patient was able to maintain room air oxygen saturation greater than 94% and did not require any oxygen supplementation.  Patient continues to feel better and tolerating antibiotics properly.  CT scan of the chest did not show any evidence of malignancy extrapulmonary infiltrate concerning for COVID-19 pneumonia with secondary bacterial pneumonia.  Patient had to be done as cross cover for internal medicine service on 8/19/2021:  Patient is a 76-year-old female that feels remarkable for rheumatoid arthritis since the age of 20 or21 and has been on various treatment and currently she is on Enbrel infusion on a monthly basis, history of latent hepatitis B and chronic hepatitis C and currently on antiviral treatment - entecavir -under the care of Dr. Longoria and follows with  [facility] that last visit with them in May 2021 when she was considered to be doing well and plan was for her to follow-up in 6 months on her usual state of health global week and a half ago when she started having increasing cough shortness of breath fatigue nausea vomiting and diarrhea.  She has been getting progressively weak.  She has not been vaccinated against COVID-19.  Patient also tested with COVID-19 resulting in her getting concerned that her symptoms could be due to Covid.  EMS was called and patient was brought to the emergency room for further evaluation where work-up revealed dense consolidation in the right upper lobe concerning for pneumonia and a possible underlying mass.  A respiratory viral panel come back positive for COVID-19 as well as RSV.  Patient was noted to be tachycardic upon presentation but was able to maintain her oxygen saturation above 94% on room air.  Patient has been appropriately started on antibacterial treatment for possible postobstructive/community-acquired pneumonia involving the right upper lung and is being admitted for further care.  Past Medical History:  Past Medical History:   Diagnosis Date   • Asthma    • Hepatitis B     Latent    • History of hepatitis C    • Rheumatoid arthritis (CMS/HCC)      Past Surgical History:  Past Surgical History:   Procedure Laterality Date   • CHOLECYSTECTOMY     • JOINT REPLACEMENT      Left hip. left and right knee      Home Meds:  Medications Prior to Admission   Medication Sig Dispense Refill Last Dose   • entecavir (BARACLUDE) 0.5 MG tablet Take 0.5 mg by mouth Daily.   8/18/2021 at Unknown time   • Etanercept (Enbrel SureClick) 50 MG/ML solution auto-injector Inject  under the skin into the appropriate area as directed 1 (One) Time Per Week. On Tuesday   Past Week at Unknown time   • loratadine (CLARITIN) 10 MG tablet Take 10 mg by mouth Daily.   8/18/2021 at Unknown time     Current Meds:     Current  Facility-Administered Medications:   •  acetaminophen (TYLENOL) tablet 650 mg, 650 mg, Oral, Q4H PRN, 650 mg at 21 2143 **OR** acetaminophen (TYLENOL) 160 MG/5ML solution 650 mg, 650 mg, Oral, Q4H PRN **OR** acetaminophen (TYLENOL) suppository 650 mg, 650 mg, Rectal, Q4H PRN, Yobany Sharma MD  •  albuterol sulfate HFA (PROVENTIL HFA;VENTOLIN HFA;PROAIR HFA) inhaler 2 puff, 2 puff, Inhalation, 4x Daily - RT, Yobany Sharma MD, 2 puff at 21 195  •  ampicillin-sulbactam (UNASYN) 3 g in sodium chloride 0.9 % 100 mL IVPB-VTB, 3 g, Intravenous, Q6H, Orlando Tamayo MD, 3 g at 21 0334  •  enoxaparin (LOVENOX) syringe 40 mg, 40 mg, Subcutaneous, Q24H, Yobany Sharma MD, 40 mg at 21 0931  •  nitroglycerin (NITROSTAT) SL tablet 0.4 mg, 0.4 mg, Sublingual, Q5 Min PRN, Yobany Sharma MD  •  ondansetron (ZOFRAN) injection 4 mg, 4 mg, Intravenous, Q6H PRN, Yobany Sharma MD  •  sodium chloride 0.9 % flush 10 mL, 10 mL, Intravenous, PRN, Yobany Sharma MD  •  sodium chloride 0.9 % flush 10 mL, 10 mL, Intravenous, Q12H, Yobany Sharma MD, 10 mL at 217  •  sodium chloride 0.9 % flush 10 mL, 10 mL, Intravenous, PRN, Yobany Sharma MD  •  sodium chloride 0.9 % infusion, 75 mL/hr, Intravenous, Continuous, Yobany Sharma MD, Last Rate: 75 mL/hr at 21 1552, 75 mL/hr at 21 1552  Allergies:  No Known Allergies  Social History:   Social History     Tobacco Use   • Smoking status: Former Smoker     Quit date: 2021     Years since quittin.2   • Smokeless tobacco: Never Used   Substance Use Topics   • Alcohol use: Never      Family History:  History reviewed. No pertinent family history.       Review of Systems  See history of present illness and past medical history.   She is doing well since her admission progressively getting better.  At the time of admission review system was recorded as follows:  Constitutional: Positive for fatigue. Negative for chills and fever.   HENT: Negative  "for congestion.    Eyes: Negative for visual disturbance.   Respiratory: Positive for cough and shortness of breath.    Cardiovascular: Negative for chest pain.   Gastrointestinal: Positive for diarrhea and nausea. Negative for abdominal pain and vomiting.   Genitourinary: Negative for difficulty urinating.   Musculoskeletal: Positive for myalgias. Negative for gait problem.   Skin: Negative for wound.   Neurological: Negative for syncope.   Psychiatric/Behavioral: Negative for suicidal ideas.   Remainder of ROS is negative.  Remainder of ROS is negative.      Vitals:   /72 (BP Location: Left arm, Patient Position: Lying)   Pulse 85   Temp 98.1 °F (36.7 °C) (Oral)   Resp 18   Ht 154.9 cm (61\")   Wt 64.8 kg (142 lb 13.7 oz)   SpO2 97%   BMI 26.99 kg/m²   I/O:     Intake/Output Summary (Last 24 hours) at 8/23/2021 0819  Last data filed at 8/23/2021 0627  Gross per 24 hour   Intake 440 ml   Output 1450 ml   Net -1010 ml     Exam:  Patient is examined using the personal protective equipment as per guidelines from infection control for this particular patient as enacted.  Hand washing was performed before and after patient interaction.    General Appearance:    Alert, cooperative, no distress, appears stated age   Head:    Normocephalic, without obvious abnormality, atraumatic   Eyes:    PERRL, conjunctiva/corneas clear, EOM's intact, both eyes   Ears:    Normal external ear canals, both ears   Nose:   Nares normal, septum midline, mucosa normal, no drainage    or sinus tenderness   Throat:   Lips, tongue, gums normal; oral mucosa pink and moist   Neck:   Supple, symmetrical, trachea midline, no adenopathy;     thyroid:  no enlargement/tenderness/nodules; no carotid    bruit or JVD   Back:     Symmetric, no curvature, ROM normal, no CVA tenderness   Lungs:    Decreased breath sounds bilaterally no obvious use of accessory muscles of breathing noted   Chest Wall:    No tenderness or deformity    Heart:    " Regular rate and rhythm, S1 and S2 normal, no murmur, rub   or gallop   Abdomen:    Soft nontender   Extremities:  Deformed extremities from rheumatoid arthritis and prior fracture of the right humerus with deformity.   Pulses:   Pulses palpable in all extremities; symmetric all extremities   Skin:   Skin color normal, Skin is warm and dry,  no rashes or palpable lesions   Neurologic:  Grossly nonfocal       Data Review:    I reviewed the patient's new clinical results.  Results from last 7 days   Lab Units 08/21/21  0706 08/20/21  0851 08/19/21  1501   WBC 10*3/mm3 7.20 8.06 14.62*   HEMOGLOBIN g/dL 10.3* 10.8* 14.8   PLATELETS 10*3/mm3 369 388 442     Results from last 7 days   Lab Units 08/21/21  0706 08/20/21  0851 08/19/21  1617   SODIUM mmol/L 139 144 144   POTASSIUM mmol/L 3.7 3.4* 3.6   CHLORIDE mmol/L 111* 110* 106   CO2 mmol/L 19.0* 23.3 25.6   BUN mg/dL 16 33* 38*   CREATININE mg/dL 0.51* 0.68 0.94   CALCIUM mg/dL 8.0* 8.1* 9.7   GLUCOSE mg/dL 82 83 96     Microbiology Results (last 10 days)     Procedure Component Value - Date/Time    Blood Culture - Blood, Arm, Left [[id number]] Collected: 08/19/21 1617    Lab Status: Preliminary result Specimen: Blood from Arm, Left Updated: 08/23/21 1630     Blood Culture No growth at 4 days    Blood Culture - Blood, Arm, Left [[id number]] Collected: 08/19/21 1552    Lab Status: Preliminary result Specimen: Blood from Arm, Left Updated: 08/23/21 1600     Blood Culture No growth at 4 days    Respiratory Panel PCR w/COVID-19(SARS-CoV-2) VINAYAK/GATO/JABARI/PAD/COR/MAD/BETTY In-House, NP Swab in UTM/VTM, 3-4 HR TAT - Swab, Nasopharynx [[id number]]  (Abnormal) Collected: 08/19/21 1434    Lab Status: Final result Specimen: Swab from Nasopharynx Updated: 08/19/21 1726     ADENOVIRUS, PCR Not Detected     Coronavirus 229E Not Detected     Coronavirus HKU1 Not Detected     Coronavirus NL63 Not Detected     Coronavirus OC43 Not Detected     COVID19 Detected     Human Metapneumovirus Not  Detected     Human Rhinovirus/Enterovirus Not Detected     Influenza A PCR Not Detected     Influenza B PCR Not Detected     Parainfluenza Virus 1 Not Detected     Parainfluenza Virus 2 Not Detected     Parainfluenza Virus 3 Not Detected     Parainfluenza Virus 4 Not Detected     RSV, PCR Detected     Bordetella pertussis pcr Not Detected     Bordetella parapertussis PCR Not Detected     Chlamydophila pneumoniae PCR Not Detected     Mycoplasma pneumo by PCR Not Detected    Narrative:      In the setting of a positive respiratory panel with a viral infection PLUS a negative procalcitonin without other underlying concern for bacterial infection, consider observing off antibiotics or discontinuation of antibiotics and continue supportive care. If the respiratory panel is positive for atypical bacterial infection (Bordetella pertussis, Chlamydophila pneumoniae, or Mycoplasma pneumoniae), consider antibiotic de-escalation to target atypical bacterial infection.            Assessment:  Active Hospital Problems    Diagnosis  POA   • **Pneumonia due to COVID-19 virus [U07.1, J12.82]  Yes   • Hypokalemia [E87.6]  Unknown   • Rheumatoid arthritis involving multiple sites (CMS/[facility]) [M06.9]  Unknown   • Immunosuppression (CMS/[facility]) [D84.9]  Unknown   • RSV infection [B97.4]  Unknown      Resolved Hospital Problems   No resolved problems to display.         Plan:  See above  Yobany Sharma MD   8/23/2021  08:19 EDT    Much of this encounter note is an electronic transcription/translation of spoken language to printed text. The electronic translation of spoken language may permit erroneous, or at times, nonsensical words or phrases to be inadvertently transcribed; Although I have reviewed the note for such errors, some may still exist

## 2021-08-23 NOTE — CASE MANAGEMENT/SOCIAL WORK
Continued Stay Note  [facility]     Patient Name: Loreto Green  MRN: [id number]  Today's Date: 8/23/2021    Admit Date: 8/19/2021    Discharge Plan     Row Name 08/23/21 1545       Plan    Plan  Return home with family    Plan Comments  Attempted to reach patient by telephone but she does not answer.  Left message for daughter Monserrat.  Mentioned that HH might be an option.  Awaiting return call.  BHumeniuk RN Becky S. [place], RN

## 2021-08-24 LAB
ANION GAP SERPL CALCULATED.3IONS-SCNC: 9 MMOL/L (ref 5–15)
BACTERIA SPEC AEROBE CULT: NORMAL
BACTERIA SPEC AEROBE CULT: NORMAL
BUN SERPL-MCNC: 5 MG/DL (ref 8–23)
BUN/CREAT SERPL: 14.7 (ref 7–25)
CALCIUM SPEC-SCNC: 7.4 MG/DL (ref 8.6–10.5)
CHLORIDE SERPL-SCNC: 108 MMOL/L (ref 98–107)
CO2 SERPL-SCNC: 23 MMOL/L (ref 22–29)
CREAT SERPL-MCNC: 0.34 MG/DL (ref 0.57–1)
DEPRECATED RDW RBC AUTO: 39.8 FL (ref 37–54)
ERYTHROCYTE [DISTWIDTH] IN BLOOD BY AUTOMATED COUNT: 12.2 % (ref 12.3–15.4)
GFR SERPL CREATININE-BSD FRML MDRD: >150 ML/MIN/1.73
GLUCOSE SERPL-MCNC: 72 MG/DL (ref 65–99)
HCT VFR BLD AUTO: 29.4 % (ref 34–46.6)
HGB BLD-MCNC: 9.9 G/DL (ref 12–15.9)
LYMPHOCYTES # BLD MANUAL: 0.97 10*3/MM3 (ref 0.7–3.1)
LYMPHOCYTES NFR BLD MANUAL: 10.1 % (ref 19.6–45.3)
LYMPHOCYTES NFR BLD MANUAL: 8.1 % (ref 5–12)
MCH RBC QN AUTO: 30.7 PG (ref 26.6–33)
MCHC RBC AUTO-ENTMCNC: 33.7 G/DL (ref 31.5–35.7)
MCV RBC AUTO: 91 FL (ref 79–97)
MONOCYTES # BLD AUTO: 0.78 10*3/MM3 (ref 0.1–0.9)
MYELOCYTES NFR BLD MANUAL: 2 % (ref 0–0)
NEUTROPHILS # BLD AUTO: 7.68 10*3/MM3 (ref 1.7–7)
NEUTROPHILS NFR BLD MANUAL: 79.8 % (ref 42.7–76)
PLAT MORPH BLD: NORMAL
PLATELET # BLD AUTO: 633 10*3/MM3 (ref 140–450)
PMV BLD AUTO: 9.9 FL (ref 6–12)
POIKILOCYTOSIS BLD QL SMEAR: ABNORMAL
POTASSIUM SERPL-SCNC: 3.7 MMOL/L (ref 3.5–5.2)
RBC # BLD AUTO: 3.23 10*6/MM3 (ref 3.77–5.28)
SODIUM SERPL-SCNC: 140 MMOL/L (ref 136–145)
WBC # BLD AUTO: 9.62 10*3/MM3 (ref 3.4–10.8)
WBC MORPH BLD: NORMAL

## 2021-08-24 PROCEDURE — 80048 BASIC METABOLIC PNL TOTAL CA: CPT | Performed by: HOSPITALIST

## 2021-08-24 PROCEDURE — 25010000003 AMPICILLIN-SULBACTAM PER 1.5 G: Performed by: HOSPITALIST

## 2021-08-24 PROCEDURE — 85007 BL SMEAR W/DIFF WBC COUNT: CPT | Performed by: HOSPITALIST

## 2021-08-24 PROCEDURE — 94760 N-INVAS EAR/PLS OXIMETRY 1: CPT

## 2021-08-24 PROCEDURE — 94799 UNLISTED PULMONARY SVC/PX: CPT

## 2021-08-24 PROCEDURE — 25010000002 ENOXAPARIN PER 10 MG: Performed by: INTERNAL MEDICINE

## 2021-08-24 PROCEDURE — 85025 COMPLETE CBC W/AUTO DIFF WBC: CPT | Performed by: HOSPITALIST

## 2021-08-24 PROCEDURE — 97110 THERAPEUTIC EXERCISES: CPT

## 2021-08-24 PROCEDURE — 97530 THERAPEUTIC ACTIVITIES: CPT

## 2021-08-24 RX ADMIN — ALBUTEROL SULFATE 2 PUFF: 90 AEROSOL, METERED RESPIRATORY (INHALATION) at 19:46

## 2021-08-24 RX ADMIN — AMPICILLIN SODIUM AND SULBACTAM SODIUM 3 G: 2; 1 INJECTION, POWDER, FOR SOLUTION INTRAMUSCULAR; INTRAVENOUS at 03:33

## 2021-08-24 RX ADMIN — SODIUM CHLORIDE, PRESERVATIVE FREE 10 ML: 5 INJECTION INTRAVENOUS at 09:22

## 2021-08-24 RX ADMIN — ALBUTEROL SULFATE 2 PUFF: 90 AEROSOL, METERED RESPIRATORY (INHALATION) at 07:48

## 2021-08-24 RX ADMIN — AMPICILLIN SODIUM AND SULBACTAM SODIUM 3 G: 2; 1 INJECTION, POWDER, FOR SOLUTION INTRAMUSCULAR; INTRAVENOUS at 11:31

## 2021-08-24 RX ADMIN — ENOXAPARIN SODIUM 40 MG: 40 INJECTION SUBCUTANEOUS at 09:22

## 2021-08-24 RX ADMIN — SODIUM CHLORIDE, PRESERVATIVE FREE 10 ML: 5 INJECTION INTRAVENOUS at 21:17

## 2021-08-24 RX ADMIN — ACETAMINOPHEN 650 MG: 325 TABLET, FILM COATED ORAL at 15:58

## 2021-08-24 RX ADMIN — ALBUTEROL SULFATE 2 PUFF: 90 AEROSOL, METERED RESPIRATORY (INHALATION) at 15:45

## 2021-08-24 RX ADMIN — AMPICILLIN SODIUM AND SULBACTAM SODIUM 3 G: 2; 1 INJECTION, POWDER, FOR SOLUTION INTRAMUSCULAR; INTRAVENOUS at 15:54

## 2021-08-24 RX ADMIN — AMPICILLIN SODIUM AND SULBACTAM SODIUM 3 G: 2; 1 INJECTION, POWDER, FOR SOLUTION INTRAMUSCULAR; INTRAVENOUS at 21:17

## 2021-08-24 RX ADMIN — ALBUTEROL SULFATE 2 PUFF: 90 AEROSOL, METERED RESPIRATORY (INHALATION) at 11:16

## 2021-08-24 NOTE — PLAN OF CARE
Goal Outcome Evaluation:  Plan of Care Reviewed With: patient        Progress: no change  Outcome Summary: No c.o overnight. Pt very flat and very poor appetite. Only had sips of water tonight. VSS. on RA. Purewick in place, skin care provided. Good UO. Q 2 turns. IV antibiotics per orders. WCTM.

## 2021-08-24 NOTE — PROGRESS NOTES
[facility]     Progress Note    Patient Name: Loreto Green  : 1945  MRN: [id number]  Primary Care Physician:  Provider, No Known  Date of admission: 2021    Subjective   Subjective     Chief Complaint: covid    Weakness - Generalized  Pertinent negatives include no chest pain.     Patient Reports doing well, continue IS, coughing up clear sputum  Denies N/v/d tolerating oral intake    Review of Systems   Constitutional: Negative for activity change and appetite change.   Respiratory: Negative for shortness of breath.    Cardiovascular: Negative for chest pain.   Psychiatric/Behavioral: Negative for agitation and behavioral problems.       Objective   Objective     Vitals:   Temp:  [98.3 °F (36.8 °C)-98.7 °F (37.1 °C)] 98.3 °F (36.8 °C)  Heart Rate:  [82-92] 92  Resp:  [16-18] 18  BP: (132-144)/(62-79) 144/79    Physical Exam  HENT:      Head: Normocephalic and atraumatic.   Cardiovascular:      Heart sounds: No murmur heard.   No friction rub.   Pulmonary:      Effort: Pulmonary effort is normal.      Breath sounds: Normal breath sounds.   Abdominal:      General: Bowel sounds are normal. There is no distension.      Palpations: Abdomen is soft.      Tenderness: There is no abdominal tenderness.   Skin:     General: Skin is warm and dry.          Result Review    Result Review:  I have personally reviewed the results from the time of this admission to 2021 15:47 EDT and agree with these findings:  [x]  Laboratory  [x]  Microbiology  [x]  Radiology  [x]  EKG/Telemetry   []  Cardiology/Vascular   []  Pathology  [x]  Old records  []  Other:  Most notable findings include: Extensive infiltrate on CT scan    Assessment/Plan   Assessment / Plan     Brief Patient Summary:  Loreto Green is a 76 y.o. female who rheumatoid arthritis and immunocompromise secondary to Enbrel    Active Hospital Problems:  Active Hospital Problems    Diagnosis    • **Pneumonia due to COVID-19 virus    • Hypokalemia     • Rheumatoid arthritis involving multiple sites (CMS/[facility])    • Immunosuppression (CMS/[facility])    • RSV infection      Plan:   Likely secondary infection to COVID-19, continue with IV antibiotics.  Extensive infiltrate  Discussed with infectious disease  will need repeat scan in 4 to 6 weeks  Given no hypoxemia will hold on dexamethasone and remdesivir  Follow-up orthopedics outpatient  Patient worked with physical therapy walked 18 feet on 8/20  Speech therapy note reviewed  Discussed with nurse    DVT prophylaxis:  Medical and mechanical DVT prophylaxis orders are present.    CODE STATUS:    Code Status: CPR  Medical Interventions (Level of Support Prior to Arrest): Full    Disposition:  I expect patient to be discharged tomorrow.    Electronically signed by Archie Craig MD, 08/22/21, 11:10 AM EDT.

## 2021-08-24 NOTE — PLAN OF CARE
Goal Outcome Evaluation:  Plan of Care Reviewed With: patient        Progress: improving  Outcome Summary: Pt agreeable to PT this date. Pt was able to perform bed mob with Ryan and STS with CGA. Pt ambulated in room with RW, CGA for 20'. Pt continues to benefit from skilled PT.    Patient was intermittently wearing a face mask during this therapy encounter. Therapist used appropriate personal protective equipment including eye protection, mask, and gloves.  Mask used was N95/duckbill. Appropriate PPE was worn during the entire therapy session. Hand hygiene was completed before and after therapy session. Patient is in enhanced droplet precautions.

## 2021-08-24 NOTE — THERAPY TREATMENT NOTE
Patient Name: Loreto Green  : 1945    MRN: [id number]                              Today's Date: 2021       Admit Date: 2021    Visit Dx:     ICD-10-CM ICD-9-CM   1. Community acquired pneumonia of right upper lobe of lung  J18.9 486   2. Sepsis without acute organ dysfunction, due to unspecified organism (CMS/HCC)  A41.9 038.9     995.91     Patient Active Problem List   Diagnosis   • Community acquired pneumonia of right upper lobe of lung   • Pneumonia due to COVID-19 virus   • RSV infection   • Lung mass   • Hypokalemia   • Rheumatoid arthritis involving multiple sites (CMS/HCC)   • Immunosuppression (CMS/HCC)     Past Medical History:   Diagnosis Date   • Asthma    • Hepatitis B     Latent    • History of hepatitis C    • Rheumatoid arthritis (CMS/HCC)      Past Surgical History:   Procedure Laterality Date   • CHOLECYSTECTOMY     • JOINT REPLACEMENT      Left hip. left and right knee     General Information     Row Name 21 165          Physical Therapy Time and Intention    Document Type  therapy note (daily note)  -DB     Mode of Treatment  individual therapy;physical therapy  -DB     Row Name 21 9120          General Information    Patient Profile Reviewed  yes  -DB     Existing Precautions/Restrictions  fall  -DB       User Key  (r) = Recorded By, (t) = Taken By, (c) = Cosigned By    Initials Name Provider Type    DB Tabatha Sanders PT Physical Therapist        Mobility     Row Name 21 6197          Bed Mobility    Bed Mobility  supine-sit;sit-supine  -DB     Supine-Sit [place] (Bed Mobility)  minimum assist (75% patient effort);verbal cues  -DB     Sit-Supine [place] (Bed Mobility)  minimum assist (75% patient effort);verbal cues  -DB     Assistive Device (Bed Mobility)  bed rails;head of bed elevated  -DB     Row Name 21 7120          Sit-Stand Transfer    Sit-Stand [place] (Transfers)  contact guard;verbal cues  -DB     Assistive Device  (Sit-Stand Transfers)  walker, front-wheeled  -DB     Row Name 08/24/21 1654          Gait/Stairs (Locomotion)    [place] Level (Gait)  contact guard;verbal cues  -DB     Assistive Device (Gait)  walker, front-wheeled  -DB     Distance in Feet (Gait)  20'  -DB     Deviations/Abnormal Patterns (Gait)  left sided deviations;gait speed decreased;ataxic  -DB     Bilateral Gait Deviations  heel strike decreased  -DB     Left Sided Gait Deviations  weight shift ability decreased  -DB       User Key  (r) = Recorded By, (t) = Taken By, (c) = Cosigned By    Initials Name Provider Type    DB Tabatha Sanders PT Physical Therapist        Obj/Interventions     Row Name 08/24/21 1655          Motor Skills    Therapeutic Exercise  other (see comments) seated MIP, LAQ, AP x10  -DB     Row Name 08/24/21 1655          Balance    Balance Assessment  sitting static balance;sitting dynamic balance;standing static balance;standing dynamic balance  -DB     Static Sitting Balance  WFL  -DB     Dynamic Sitting Balance  WFL  -DB     Static Standing Balance  WFL;supported  -DB     Dynamic Standing Balance  mild impairment  -DB     Balance Interventions  sitting;standing;sit to stand  -DB       User Key  (r) = Recorded By, (t) = Taken By, (c) = Cosigned By    Initials Name Provider Type    Tabatha Ervin, BERTHA Physical Therapist        Goals/Plan    No documentation.       Clinical Impression     Row Name 08/24/21 1656          Pain Scale: Numbers Pre/Post-Treatment    Pain Intervention(s)  Ambulation/increased activity;Repositioned  -DB     Row Name 08/24/21 1656          Plan of Care Review    Plan of Care Reviewed With  patient  -DB     Progress  improving  -DB     Outcome Summary  Pt agreeable to PT this date. Pt was able to perform bed mob with Ryan and STS with CGA. Pt ambulated in room with RW, CGA for 20'. Pt continues to benefit from skilled PT.  -DB     Row Name 08/24/21 1656          Vital Signs    O2 Delivery Pre  Treatment  room air  -DB     O2 Delivery Intra Treatment  room air  -DB     O2 Delivery Post Treatment  room air  -DB     Pre Patient Position  Supine  -DB     Intra Patient Position  Standing  -DB     Post Patient Position  Supine  -DB     Row Name 08/24/21 1656          Positioning and Restraints    Pre-Treatment Position  in bed  -DB     Post Treatment Position  bed  -DB     In Bed  supine;call light within reach;encouraged to call for assist;exit alarm on  -DB       User Key  (r) = Recorded By, (t) = Taken By, (c) = Cosigned By    Initials Name Provider Type    Tabatha Ervin PT Physical Therapist        Outcome Measures     Row Name 08/24/21 1657          How much help from another person do you currently need...    Turning from your back to your side while in flat bed without using bedrails?  3  -DB     Moving from lying on back to sitting on the side of a flat bed without bedrails?  3  -DB     Moving to and from a bed to a chair (including a wheelchair)?  3  -DB     Standing up from a chair using your arms (e.g., wheelchair, bedside chair)?  3  -DB     Climbing 3-5 steps with a railing?  2  -DB     To walk in hospital room?  3  -DB     AM-PAC 6 Clicks Score (PT)  17  -DB     Row Name 08/24/21 1657          Functional Assessment    Outcome Measure Options  AM-PAC 6 Clicks Basic Mobility (PT)  -DB       User Key  (r) = Recorded By, (t) = Taken By, (c) = Cosigned By    Initials Name Provider Type    Tabatha Ervin PT Physical Therapist                       Physical Therapy Education                 Title: PT OT SLP Therapies (In Progress)     Topic: Physical Therapy (Done)     Point: Mobility training (Done)     Learning Progress Summary           Patient Acceptance, E, VU by DB at 8/24/2021 1658    Acceptance, E, VU,NR by  at 8/20/2021 1324                   Point: Home exercise program (Done)     Learning Progress Summary           Patient Acceptance, E, VU by CHIDI at 8/24/2021 1658                    Point: Body mechanics (Done)     Learning Progress Summary           Patient Acceptance, E, VU by DB at 8/24/2021 1658                   Point: Precautions (Done)     Learning Progress Summary           Patient Acceptance, E, VU by DB at 8/24/2021 1658                               User Key     Initials Effective Dates Name Provider Type Discipline    DB 06/16/21 -  Tabatha Sanders PT Physical Therapist PT    MW 06/16/21 -  Roxie Tijerina PT Physical Therapist PT              PT Recommendation and Plan     Plan of Care Reviewed With: patient  Progress: improving  Outcome Summary: Pt agreeable to PT this date. Pt was able to perform bed mob with Ryan and STS with CGA. Pt ambulated in room with RW, CGA for 20'. Pt continues to benefit from skilled PT.     Time Calculation:   PT Charges     Row Name 08/24/21 1658             Time Calculation    Start Time  1606  -DB      Stop Time  1623  -DB      Time Calculation (min)  17 min  -DB      PT Received On  08/24/21  -DB      PT - Next Appointment  08/26/21  -DB         Time Calculation- PT    Total Timed Code Minutes- PT  17 minute(s)  -DB        User Key  (r) = Recorded By, (t) = Taken By, (c) = Cosigned By    Initials Name Provider Type    DB Tabatha Sanders, PT Physical Therapist        Therapy Charges for Today     Code Description Service Date Service Provider Modifiers Qty    72489142430 HC PT THER PROC EA 15 MIN 8/24/2021 Tabatha Sanders PT GP 1          PT G-Codes  Outcome Measure Options: AM-PAC 6 Clicks Basic Mobility (PT)  AM-PAC 6 Clicks Score (PT): 17  AM-PAC 6 Clicks Score (OT): 16    Tabatha Sanders PT  8/24/2021

## 2021-08-24 NOTE — CASE MANAGEMENT/SOCIAL WORK
Continued Stay Note  [facility]     Patient Name: Loreto Green  MRN: [id number]  Today's Date: 8/24/2021    Admit Date: 8/19/2021    Discharge Plan     Row Name 08/24/21 1517       Plan    Plan  home with family and [facility]  ( referral pending)    Patient/Family in Agreement with Plan  yes    Plan Comments  Spoke with daughter, Monserrat, by telephone to update that patient will likely be dc'd tomorrow.  She states that she or his sister will transport the patient home.  Discussed HH and she is agreeable.  Referral sent to [facility] .  CCP will follow. Alice Bauer RN        Discharge Codes    No documentation.             Alice Bauer RN

## 2021-08-24 NOTE — PROGRESS NOTES
"  Infectious Diseases Progress Note    Yobany Sharma MD     [facility]  [place]: 5 days  Patient Identification:  Name: Loreto Green  Age: 76 y.o.  Sex: female  :  1945  MRN: [id number]         Primary Care Physician: Provider, No Known            Subjective: Feeling better denies any complaints. Breathing better.  Interval History: See consultation note.    Objective:    Scheduled Meds:albuterol sulfate HFA, 2 puff, Inhalation, 4x Daily - RT  ampicillin-sulbactam, 3 g, Intravenous, Q6H  enoxaparin, 40 mg, Subcutaneous, Q24H  sodium chloride, 10 mL, Intravenous, Q12H      Continuous Infusions:     Vital signs in last 24 hours:  Temp:  [98 °F (36.7 °C)-98.7 °F (37.1 °C)] 98.5 °F (36.9 °C)  Heart Rate:  [78-89] 84  Resp:  [16-18] 16  BP: (132-158)/(62-88) 132/62    Intake/Output:    Intake/Output Summary (Last 24 hours) at 2021 0750  Last data filed at 2021 0340  Gross per 24 hour   Intake 530 ml   Output 1200 ml   Net -670 ml       Exam:  /62 (BP Location: Left arm, Patient Position: Lying)   Pulse 84   Temp 98.5 °F (36.9 °C) (Oral)   Resp 16   Ht 154.9 cm (61\")   Wt 64.8 kg (142 lb 13.7 oz)   SpO2 94%   BMI 26.99 kg/m²   Patient is examined using the personal protective equipment as per guidelines from infection control for this particular patient as enacted.  Hand washing was performed before and after patient interaction.  General Appearance:  Alert comfortable sitting in the chair and does not appear to be toxic or septic. Able to maintain oxygen saturation greater than 94% on room air.  Head:    Normocephalic, without obvious abnormality, atraumatic   Eyes:    PERRL, conjunctiva/corneas clear, EOM's intact, both eyes   Ears:    Normal external ear canals, both ears   Nose:   Nares normal, septum midline, mucosa normal, no drainage    or sinus tenderness   Throat:   Lips, tongue, gums normal; oral mucosa pink and moist   Neck:   Supple, symmetrical, trachea midline, no " adenopathy;     thyroid:  no enlargement/tenderness/nodules; no carotid    bruit or JVD   Back:     Symmetric, no curvature, ROM normal, no CVA tenderness   Lungs:    Decreased breath sounds bilaterally no obvious use of accessory muscles of breathing noted   Chest Wall:    No tenderness or deformity    Heart:    Regular rate and rhythm, S1 and S2 normal, no murmur, rub   or gallop   Abdomen:    Soft nontender   Extremities:  Deformed extremities from rheumatoid arthritis and prior fracture of the right humerus with deformity.   Pulses:   Pulses palpable in all extremities; symmetric all extremities   Skin:   Skin color normal, Skin is warm and dry,  no rashes or palpable lesions   Neurologic:  Grossly nonfocal            Data Review:    I reviewed the patient's new clinical results.  Results from last 7 days   Lab Units 08/23/21  1309 08/21/21  0706 08/20/21  0851 08/19/21  1501   WBC 10*3/mm3 10.24 7.20 8.06 14.62*   HEMOGLOBIN g/dL 11.0* 10.3* 10.8* 14.8   PLATELETS 10*3/mm3 582* 369 388 442     Results from last 7 days   Lab Units 08/23/21  1309 08/21/21  0706 08/20/21  0851 08/19/21  1617   SODIUM mmol/L 140 139 144 144   POTASSIUM mmol/L 3.2* 3.7 3.4* 3.6   CHLORIDE mmol/L 108* 111* 110* 106   CO2 mmol/L 20.9* 19.0* 23.3 25.6   BUN mg/dL 6* 16 33* 38*   CREATININE mg/dL 0.51* 0.51* 0.68 0.94   CALCIUM mg/dL 7.6* 8.0* 8.1* 9.7   GLUCOSE mg/dL 115* 82 83 96     No radiology results for the last 30 days.  Microbiology Results (last 10 days)     Procedure Component Value - Date/Time    Blood Culture - Blood, Arm, Left [[id number]] Collected: 08/19/21 1617    Lab Status: Final result Specimen: Blood from Arm, Left Updated: 08/24/21 1630     Blood Culture No growth at 5 days    Blood Culture - Blood, Arm, Left [[id number]] Collected: 08/19/21 1552    Lab Status: Final result Specimen: Blood from Arm, Left Updated: 08/24/21 1600     Blood Culture No growth at 5 days    Narrative:            Respiratory Panel PCR  w/COVID-19(SARS-CoV-2) VINAYAK/GATO/JABARI/PAD/COR/MAD/BETTY In-House, NP Swab in UTM/VTM, 3-4 HR TAT - Swab, Nasopharynx [[id number]]  (Abnormal) Collected: 08/19/21 1434    Lab Status: Final result Specimen: Swab from Nasopharynx Updated: 08/19/21 4126     ADENOVIRUS, PCR Not Detected     Coronavirus 229E Not Detected     Coronavirus HKU1 Not Detected     Coronavirus NL63 Not Detected     Coronavirus OC43 Not Detected     COVID19 Detected     Human Metapneumovirus Not Detected     Human Rhinovirus/Enterovirus Not Detected     Influenza A PCR Not Detected     Influenza B PCR Not Detected     Parainfluenza Virus 1 Not Detected     Parainfluenza Virus 2 Not Detected     Parainfluenza Virus 3 Not Detected     Parainfluenza Virus 4 Not Detected     RSV, PCR Detected     Bordetella pertussis pcr Not Detected     Bordetella parapertussis PCR Not Detected     Chlamydophila pneumoniae PCR Not Detected     Mycoplasma pneumo by PCR Not Detected    Narrative:      In the setting of a positive respiratory panel with a viral infection PLUS a negative procalcitonin without other underlying concern for bacterial infection, consider observing off antibiotics or discontinuation of antibiotics and continue supportive care. If the respiratory panel is positive for atypical bacterial infection (Bordetella pertussis, Chlamydophila pneumoniae, or Mycoplasma pneumoniae), consider antibiotic de-escalation to target atypical bacterial infection.            Assessment: Continues to improve.    Pneumonia due to COVID-19 virus    RSV infection    Hypokalemia    Rheumatoid arthritis involving multiple sites (CMS/HCC)    Immunosuppression (CMS/HCC)  1-right upper lung bacterial pneumonia improving on antibiotic therapy  2-dual respiratory viral infection consisting of COVID-19 and RSV with no significant respiratory compromise and able to maintain oxygen saturation greater than 94% on room air  3-immunocompromised host with history of rheumatoid  arthritis  4-history of hypertension.  5-deformity of right upper extremity        Recommendations/Discussions:  · It appears patient has done well with initial management plan at the time of admission.    · Would recommend continuation of IV Unasyn and changed to oral Augmentin to complete the treatment.  · Since she is not hypoxic and doing well she does not need any Covid specific treatment.  · Agree with plans for repeating imaging studies of her chest in few months and may benefit from pulmonary evaluation so that she can have a plan for follow-up on lung findings going forward.  · Would recommend 7 days of antibiotic treatment while closely monitoring her for oxygen needs.  · Discussed with Dr. Craig.    Yobany Sharma MD  8/24/2021  07:50 EDT    Much of this encounter note is an electronic transcription/translation of spoken language to printed text. The electronic translation of spoken language may permit erroneous, or at times, nonsensical words or phrases to be inadvertently transcribed; Although I have reviewed the note for such errors, some may still exist

## 2021-08-24 NOTE — DISCHARGE PLACEMENT REQUEST
"Loreto Barahona (76 y.o. Female)     Date of Birth Social Security Number Address Home Phone MRN    1945  [id number] [id number] [phone] [id number]    [employer] Marital Status          [facility] Single       Admission Date Admission Type Admitting Provider Attending Provider Department, Room/Bed    8/19/21 Emergency Yobany Sharma MD Edling, Stephen A, MD [address], [address]    Discharge Date Discharge Disposition Discharge Destination                       Attending Provider: Archie Craig MD    Allergies: No Known Allergies    Isolation: Enh Drop/Con, Contact   Infection: RSV (08/19/21), COVID (confirmed) (08/19/21)   Code Status: CPR    Ht: 154.9 cm (61\")   Wt: 64.8 kg (142 lb 13.7 oz)    Admission Cmt: None   Principal Problem: Pneumonia due to COVID-19 virus [U07.1,J12.82]                 Active Insurance as of 8/19/2021     Primary Coverage     Payor Plan Insurance Group Employer/Plan Group    MEDICARE MEDICARE B ONLY      Payor Plan Address Payor Plan Phone Number Payor Plan Fax Number Effective Dates    [address] [address] [phone]  8/1/2012 - None Entered    [facility] [id number]       Subscriber Name Subscriber Birth Date Member ID       LORETO BARAHONA 1945 [id number]           Secondary Coverage     Payor Plan Insurance Group Employer/Plan Group    [place] [id number]     Payor Plan Address Payor Plan Phone Number Payor Plan Fax Number Effective Dates    [employer] [address] [address] [phone]  7/1/2021 - None Entered    [facility] [id number]       Subscriber Name Subscriber Birth Date Member ID       LORETO BARAHONA 1945 [id number]                 Emergency Contacts      (Rel.) Home Phone Work Phone Mobile Phone    WEICLAUDE LENTZ (Daughter) [phone] -- --    Monserrat Espinal (Daughter) [phone] -- --              "

## 2021-08-24 NOTE — THERAPY TREATMENT NOTE
Patient Name: Loreto Green  : 1945    MRN: [id number]                              Today's Date: 2021       Admit Date: 2021    Visit Dx:     ICD-10-CM ICD-9-CM   1. Community acquired pneumonia of right upper lobe of lung  J18.9 486   2. Sepsis without acute organ dysfunction, due to unspecified organism (CMS/HCC)  A41.9 038.9     995.91     Patient Active Problem List   Diagnosis   • Community acquired pneumonia of right upper lobe of lung   • Pneumonia due to COVID-19 virus   • RSV infection   • Lung mass   • Hypokalemia   • Rheumatoid arthritis involving multiple sites (CMS/HCC)   • Immunosuppression (CMS/HCC)     Past Medical History:   Diagnosis Date   • Asthma    • Hepatitis B     Latent    • History of hepatitis C    • Rheumatoid arthritis (CMS/HCC)      Past Surgical History:   Procedure Laterality Date   • CHOLECYSTECTOMY     • JOINT REPLACEMENT      Left hip. left and right knee     General Information     Row Name 21 1255          OT Time and Intention    Document Type  therapy note (daily note)  -BL     Mode of Treatment  occupational therapy;individual therapy  -BL     Row Name 21 1255          General Information    Patient Profile Reviewed  yes  -BL     Existing Precautions/Restrictions  fall  -BL     Row Name 21 1255          Cognition    Orientation Status (Cognition)  oriented x 4  -BL     Row Name 21 1256          Safety Issues, Functional Mobility    Safety Issues Affecting Function (Mobility)  insight into deficits/self-awareness;awareness of need for assistance  -BL     Impairments Affecting Function (Mobility)  balance;endurance/activity tolerance;shortness of breath;strength  -BL       User Key  (r) = Recorded By, (t) = Taken By, (c) = Cosigned By    Initials Name Provider Type    BL Therese Gaspar OT Occupational Therapist          Mobility/ADL's     Row Name 21 1250          Bed Mobility    Bed Mobility  supine-sit  -BL     Supine-Sit  [place] (Bed Mobility)  contact guard  -     Assistive Device (Bed Mobility)  bed rails;head of bed elevated  -     Row Name 08/24/21 1258          Transfers    Transfers  sit-stand transfer;bed-chair transfer  -     Comment (Transfers)  Pt performed sit>stand transition with Min A.  -BL     Bed-Chair [place] (Transfers)  minimum assist (75% patient effort);verbal cues  -     Sit-Stand [place] (Transfers)  minimum assist (75% patient effort);verbal cues  -     Row Name 08/24/21 1258          Activities of Daily Living    BADL Assessment/Intervention  grooming  -     Row Name 08/24/21 1258          Grooming Assessment/Training    [place] Level (Grooming)  wash face, hands;set up  -     Position (Grooming)  supported sitting  -       User Key  (r) = Recorded By, (t) = Taken By, (c) = Cosigned By    Initials Name Provider Type    Therese Dove, OT Occupational Therapist        Obj/Interventions    No documentation.       Goals/Plan    No documentation.       Clinical Impression     Row Name 08/24/21 1301          Pain Assessment    Additional Documentation  Pain Scale: Numbers Pre/Post-Treatment (Group)  -     Row Name 08/24/21 1301          Pain Scale: Numbers Pre/Post-Treatment    Pretreatment Pain Rating  0/10 - no pain  -BL     Posttreatment Pain Rating  0/10 - no pain  -     Row Name 08/24/21 1301          Plan of Care Review    Plan of Care Reviewed With  patient  -BL     Progress  no change  -     Outcome Summary  Pt seen by OT this date for treatment. Upon arrival pt lying supine in bed, no c/o pain, A&O x4. Pt performed bed mobility to sit EOB with CGA. Pt performed sit>stand transition with Min A to transfer from bed>chair with verbal cues. Pt completed grooming task sitting up in chair with S/u. Pt declined further ADL task and BUE therex. Pt left sitting up in chair, needs in reach. Pt to continue with skilled OT services to address goals and deficits. OT wore  mask, gloves, glasses, hand hygiene performed, appropriate ppe.  -BL     Row Name 08/24/21 1301          Vital Signs    Pre Patient Position  Supine  -BL     Intra Patient Position  Standing  -BL     Post Patient Position  Sitting  -BL     Row Name 08/24/21 1301          Positioning and Restraints    Pre-Treatment Position  in bed  -BL     Post Treatment Position  chair  -BL     In Chair  notified nsg;sitting;call light within reach;encouraged to call for assist  -BL       User Key  (r) = Recorded By, (t) = Taken By, (c) = Cosigned By    Initials Name Provider Type    Therese Dove OT Occupational Therapist        Outcome Measures    No documentation.         Occupational Therapy Education                 Title: PT OT SLP Therapies (In Progress)     Topic: Occupational Therapy (Done)     Point: ADL training (Done)     Description:   Instruct learner(s) on proper safety adaptation and remediation techniques during self care or transfers.   Instruct in proper use of assistive devices.              Learning Progress Summary           Patient Acceptance, E, VU by BT at 8/20/2021 1019    Comment: purpose of OT, adl retraining, functional transfer training                   Point: Home exercise program (Done)     Description:   Instruct learner(s) on appropriate technique for monitoring, assisting and/or progressing therapeutic exercises/activities.              Learning Progress Summary           Patient Acceptance, E, VU by BT at 8/20/2021 1019    Comment: purpose of OT, adl retraining, functional transfer training                   Point: Precautions (Done)     Description:   Instruct learner(s) on prescribed precautions during self-care and functional transfers.              Learning Progress Summary           Patient Acceptance, E, VU by BT at 8/20/2021 1019    Comment: purpose of OT, adl retraining, functional transfer training                   Point: Body mechanics (Done)     Description:   Instruct  learner(s) on proper positioning and spine alignment during self-care, functional mobility activities and/or exercises.              Learning Progress Summary           Patient Acceptance, E, VU by  at 8/20/2021 1019    Comment: purpose of OT, adl retraining, functional transfer training                               User Key     Initials Effective Dates Name Provider Type [facility] 11/16/20 -  Zaynab Burrell OT Occupational Therapist OT              OT Recommendation and Plan     Plan of Care Review  Plan of Care Reviewed With: patient  Progress: no change  Outcome Summary: Pt seen by OT this date for treatment. Upon arrival pt lying supine in bed, no c/o pain, A&O x4. Pt performed bed mobility to sit EOB with CGA. Pt performed sit>stand transition with Min A to transfer from bed>chair with verbal cues. Pt completed grooming task sitting up in chair with S/u. Pt declined further ADL task and BUE therex. Pt left sitting up in chair, needs in reach. Pt to continue with skilled OT services to address goals and deficits. OT wore mask, gloves, glasses, hand hygiene performed, appropriate ppe.     Time Calculation:   Time Calculation- OT     Row Name 08/24/21 1303             Time Calculation- OT    OT Start Time  1036  -BL      OT Stop Time  1053  -BL      OT Time Calculation (min)  17 min  -BL      Total Timed Code Minutes- OT  17 minute(s)  -BL      OT Received On  08/24/21  -BL      OT - Next Appointment  08/25/21  -BL         Timed Charges    44383 - OT Therapeutic Activity Minutes  17  -BL         Total Minutes    Timed Charges Total Minutes  17  -BL       Total Minutes  17  -BL        User Key  (r) = Recorded By, (t) = Taken By, (c) = Cosigned By    Initials Name Provider Type     Therese Gaspar OT Occupational Therapist        Therapy Charges for Today     Code Description Service Date Service Provider Modifiers Qty    [id number]  OT THERAPEUTIC ACT EA 15 MIN 8/24/2021 Therese Gaspar OT GO 1                Therese Gaspar, OT  8/24/2021

## 2021-08-24 NOTE — PLAN OF CARE
Goal Outcome Evaluation:  Plan of Care Reviewed With: patient        Progress: no change  Outcome Summary: Pt seen by OT this date for treatment. Upon arrival pt lying supine in bed, no c/o pain, A&O x4. Pt performed bed mobility to sit EOB with CGA. Pt performed sit>stand transition with Min A to transfer from bed>chair with verbal cues. Pt completed grooming task sitting up in chair with S/u. Pt declined further ADL task and BUE therex. Pt left sitting up in chair, needs in reach. Pt to continue with skilled OT services to address goals and deficits. OT wore mask, gloves, glasses, hand hygiene performed, appropriate ppe.

## 2021-08-24 NOTE — NURSING NOTE
IV site slightly infiltrated this am. Pt denies discomfort. No erythema, warmth. Mildly edematous. IV site discontinued. New IV site attempted x1 W no success. Pt hard stick and only has one arm that we ate able to stick as R arm is restricted. IV team called and left message.

## 2021-08-24 NOTE — PLAN OF CARE
Patient remains on RA and stable. PT and OT worked with the patient this shift. She did have an elevated temp at 99.1 this afternoon. Tylenol given. Patient remains A & O X 4, with stable vitals. Possible DC tomorrow, home with daughter. WCTM.         Problem: Adult Inpatient Plan of Care  Goal: Plan of Care Review  Outcome: Ongoing, Progressing  Flowsheets  Taken 8/24/2021 1657 by Margie Mccloud RN  Progress: improving  Taken 8/24/2021 1656 by Tabatha Sanders PT  Plan of Care Reviewed With: patient  Goal: Optimal Comfort and Wellbeing  Outcome: Ongoing, Progressing  Intervention: Provide Person-Centered Care  Recent Flowsheet Documentation  Taken 8/24/2021 1350 by Margie Mccloud RN  Trust Relationship/Rapport:   care explained   choices provided  Taken 8/24/2021 0915 by Margie Mccloud RN  Trust Relationship/Rapport:   care explained   choices provided  Goal: Readiness for Transition of Care  Outcome: Ongoing, Progressing     Problem: Fluid Imbalance (Pneumonia)  Goal: Fluid Balance  Outcome: Ongoing, Progressing   Goal Outcome Evaluation:           Progress: improving

## 2021-08-24 NOTE — SIGNIFICANT NOTE
08/24/21 1356 08/24/21 1357 08/24/21 1358   Oxygen Therapy   SpO2 92 % 94 % 92 %   Device (Oxygen Therapy) room air  (Sitting in the chair ) room air  (Standing) room air  (walking)         Walking pulse ox. Patient did not Desat while walking. She did say that she felt SOA while ambulating. O2 sats as reflected above.

## 2021-08-25 ENCOUNTER — TRANSCRIBE ORDERS (OUTPATIENT)
Dept: HOME HEALTH SERVICES | Facility: HOME HEALTHCARE | Age: 76
End: 2021-08-25

## 2021-08-25 ENCOUNTER — HOME HEALTH ADMISSION (OUTPATIENT)
Dept: HOME HEALTH SERVICES | Facility: HOME HEALTHCARE | Age: 76
End: 2021-08-25

## 2021-08-25 ENCOUNTER — READMISSION MANAGEMENT (OUTPATIENT)
Dept: CALL CENTER | Facility: HOSPITAL | Age: 76
End: 2021-08-25

## 2021-08-25 VITALS
SYSTOLIC BLOOD PRESSURE: 131 MMHG | WEIGHT: 142.86 LBS | HEIGHT: 61 IN | RESPIRATION RATE: 20 BRPM | BODY MASS INDEX: 26.97 KG/M2 | HEART RATE: 91 BPM | OXYGEN SATURATION: 97 % | TEMPERATURE: 97.6 F | DIASTOLIC BLOOD PRESSURE: 75 MMHG

## 2021-08-25 DIAGNOSIS — J12.82 PNEUMONIA DUE TO COVID-19 VIRUS: Primary | ICD-10-CM

## 2021-08-25 DIAGNOSIS — U07.1 PNEUMONIA DUE TO COVID-19 VIRUS: Primary | ICD-10-CM

## 2021-08-25 DIAGNOSIS — B33.8 RSV INFECTION: ICD-10-CM

## 2021-08-25 DIAGNOSIS — U07.1 CLINICAL DIAGNOSIS OF SEVERE ACUTE RESPIRATORY SYNDROME CORONAVIRUS 2 (SARS-COV-2) DISEASE: Primary | ICD-10-CM

## 2021-08-25 PROBLEM — D89.831 CYTOKINE RELEASE SYNDROME, GRADE 1: Status: ACTIVE | Noted: 2021-08-25

## 2021-08-25 PROCEDURE — 25010000002 ENOXAPARIN PER 10 MG: Performed by: INTERNAL MEDICINE

## 2021-08-25 PROCEDURE — 94799 UNLISTED PULMONARY SVC/PX: CPT

## 2021-08-25 PROCEDURE — 25010000003 AMPICILLIN-SULBACTAM PER 1.5 G: Performed by: HOSPITALIST

## 2021-08-25 RX ORDER — AMOXICILLIN AND CLAVULANATE POTASSIUM 875; 125 MG/1; MG/1
1 TABLET, FILM COATED ORAL 2 TIMES DAILY
Qty: 4 TABLET | Refills: 0 | Status: SHIPPED | OUTPATIENT
Start: 2021-08-25 | End: 2021-08-27

## 2021-08-25 RX ORDER — ACETAMINOPHEN 325 MG/1
650 TABLET ORAL EVERY 4 HOURS PRN
Start: 2021-08-25

## 2021-08-25 RX ADMIN — AMPICILLIN SODIUM AND SULBACTAM SODIUM 3 G: 2; 1 INJECTION, POWDER, FOR SOLUTION INTRAMUSCULAR; INTRAVENOUS at 02:56

## 2021-08-25 RX ADMIN — ENOXAPARIN SODIUM 40 MG: 40 INJECTION SUBCUTANEOUS at 09:16

## 2021-08-25 RX ADMIN — SODIUM CHLORIDE, PRESERVATIVE FREE 10 ML: 5 INJECTION INTRAVENOUS at 09:17

## 2021-08-25 RX ADMIN — AMPICILLIN SODIUM AND SULBACTAM SODIUM 3 G: 2; 1 INJECTION, POWDER, FOR SOLUTION INTRAMUSCULAR; INTRAVENOUS at 09:16

## 2021-08-25 RX ADMIN — ALBUTEROL SULFATE 2 PUFF: 90 AEROSOL, METERED RESPIRATORY (INHALATION) at 08:21

## 2021-08-25 NOTE — PAYOR COMM NOTE
"Loreto Barahona (76 y.o. Female)     Please see attahached.  PT REMAINS ON PT.  AS OF NOW.   WHEN SHE DC'S I WILL FAX DC SUMMARY.    REF#[id number]    PLEASE CALL   OR  [phone] WITH INPT CONTINUED STAY AUTH.    THANK YOU    LUPIS [facility]SU LPN CC    Date of Birth Social Security Number Address Home Phone MRN    1945  [address] [id number] [phone] [id number]    Orthodoxy Marital Status          [facility] Single       Admission Date Admission Type Admitting Provider Attending Provider Department, Room/Bed    8/19/21 Emergency Yobany Sharma MD Edling, Stephen A, MD [address], [address]    Discharge Date Discharge Disposition Discharge Destination                       Attending Provider: Archie Craig MD    Allergies: No Known Allergies    Isolation: Enh Drop/Con, Contact   Infection: RSV (08/19/21), COVID (confirmed) (08/19/21)   Code Status: CPR    Ht: 154.9 cm (61\")   Wt: 64.8 kg (142 lb 13.7 oz)    Admission Cmt: None   Principal Problem: Pneumonia due to COVID-19 virus [U07.1,J12.82]                 Active Insurance as of 8/19/2021     Primary Coverage     Payor Plan Insurance Group Employer/Plan Group    MEDICARE MEDICARE B ONLY      Payor Plan Address Payor Plan Phone Number Payor Plan Fax Number Effective Dates    [address] [address] [phone]  8/1/2012 - None Entered    [facility] [id number]       Subscriber Name Subscriber Birth Date Member ID       LORETO BARAHONA 1945 [id number]           Secondary Coverage     Payor Plan Insurance Group Employer/Plan Group    [place] [id number]     Payor Plan Address Payor Plan Phone Number Payor Plan Fax Number Effective Dates    [employer] [address] [address] [phone]  7/1/2021 - None Entered    [facility] [id number]       Subscriber Name Subscriber Birth Date Member ID       LORETO BARAHONA 1945 [id number]                 Emergency Contacts     Contact " Person (Rel.) Home Phone Work Phone Mobile Phone    CLAUDE WEI (Daughter) [phone] -- --    Monserrat Espinal (Daughter) [phone] -- --            Oxygen Therapy (last day)     Date/Time   SpO2   Device (Oxygen Therapy)   Flow (L/min)   Oxygen Concentration (%)   ETCO2 (mmHg)    21 0821   97   room air   --   --   --    21 0022   --   room air   --   --   --    21 2316   95   room air   --   --   --    21 2053   --   room air   --   --   --    21   --   room air   --   --   --    21 1945   94   room air   --   --   --    21 1551   --   room air   --   --   --    21 1545   96   room air   --   --   --    21 1359   95   room air   --   --   --    21 1358   92   room air   --   --   --    21 1357   94   room air   --   --   --    21 1356   92   room air   --   --   --    21 1350   --   room air   --   --   --    21 1116   92   room air   --   --   --    21 0915   --   room air   --   --   --    21 0900   99   room air   --   --   --    21 0000   --   room air   --   --   --                 Physician Progress Notes (last 24 hours) (Notes from 21 0826 through 21 0826)      Archie Craig MD at 21 1547           [facility]     Progress Note    Patient Name: Loreto Green  : 1945  MRN: [id number]  Primary Care Physician:  Provider, No Known  Date of admission: 2021    Subjective   Subjective     Chief Complaint: covid    Weakness - Generalized  Pertinent negatives include no chest pain.     Patient Reports doing well, continue IS, coughing up clear sputum  Denies N/v/d tolerating oral intake    Review of Systems   Constitutional: Negative for activity change and appetite change.   Respiratory: Negative for shortness of breath.    Cardiovascular: Negative for chest pain.   Psychiatric/Behavioral: Negative for agitation and behavioral problems.       Objective   Objective      Vitals:   Temp:  [98.3 °F (36.8 °C)-98.7 °F (37.1 °C)] 98.3 °F (36.8 °C)  Heart Rate:  [82-92] 92  Resp:  [16-18] 18  BP: (132-144)/(62-79) 144/79    Physical Exam  HENT:      Head: Normocephalic and atraumatic.   Cardiovascular:      Heart sounds: No murmur heard.   No friction rub.   Pulmonary:      Effort: Pulmonary effort is normal.      Breath sounds: Normal breath sounds.   Abdominal:      General: Bowel sounds are normal. There is no distension.      Palpations: Abdomen is soft.      Tenderness: There is no abdominal tenderness.   Skin:     General: Skin is warm and dry.          Result Review    Result Review:  I have personally reviewed the results from the time of this admission to 8/24/2021 15:47 EDT and agree with these findings:  [x]  Laboratory  [x]  Microbiology  [x]  Radiology  [x]  EKG/Telemetry   []  Cardiology/Vascular   []  Pathology  [x]  Old records  []  Other:  Most notable findings include: Extensive infiltrate on CT scan    Assessment/Plan   Assessment / Plan     Brief Patient Summary:  Loreto Green is a 76 y.o. female who rheumatoid arthritis and immunocompromise secondary to Enbrel    Active Hospital Problems:  Active Hospital Problems    Diagnosis    • **Pneumonia due to COVID-19 virus    • Hypokalemia    • Rheumatoid arthritis involving multiple sites (CMS/[facility])    • Immunosuppression (CMS/[facility])    • RSV infection      Plan:   Likely secondary infection to COVID-19, continue with IV antibiotics.  Extensive infiltrate  Discussed with infectious disease  will need repeat scan in 4 to 6 weeks  Given no hypoxemia will hold on dexamethasone and remdesivir  Follow-up orthopedics outpatient  Patient worked with physical therapy walked 18 feet on 8/20  Speech therapy note reviewed  Discussed with nurse    DVT prophylaxis:  Medical and mechanical DVT prophylaxis orders are present.    CODE STATUS:    Code Status: CPR  Medical Interventions (Level of Support Prior to Arrest):  Full    Disposition:  I expect patient to be discharged tomorrow.    Electronically signed by Archie Craig MD, 08/22/21, 11:10 AM EDT.             Electronically signed by Archie Craig MD at 08/24/21 8291

## 2021-08-25 NOTE — OUTREACH NOTE
Prep Survey      Responses   [facility] facility patient discharged from?  [facility]   Is LACE score < 7 ?  No   Emergency Room discharge w/ pulse ox?  No   Eligibility  Readm Mgmt   Discharge diagnosis  Pneumonia due to COVID-19 virus   Does the patient have one of the following disease processes/diagnoses(primary or secondary)?  COVID-19   Does the patient have Home health ordered?  Yes   What is the Home health agency?   [facility]   Is there a DME ordered?  No   Prep survey completed?  Yes          Misti Shafer RN

## 2021-08-25 NOTE — PLAN OF CARE
Goal Outcome Evaluation:  Plan of Care Reviewed With: patient        Progress: improving  Outcome Summary: No c.o overnight. Bharat flat affect and does not interatct much with staff. Rested on and off. Remains on RA. VSS. IV antibiotics per orders. Pt A&0X4. Purewick in place. Q2 turns provided. Fluid intake encouraged but pt refuses. WCTM.

## 2021-08-25 NOTE — PROGRESS NOTES
[facility] given referral for nursing oversight following D/C.  Reached out to [facility] and they will be willing to follow patient for primary care since patient does not currently have a PCP.  Per Velia with [facility], they will be reaching out to schedule the visit with the patient/family and they were informed patient was D/Cing today.  Clarified that all info is correct however please include patient lives in [address].  Spoke with Dtr Monserrat and she states patient lives with Dtr Jessenia and her number can be added to emergency contacts [phone].  Thank you.  Ingrid Dunham RN

## 2021-08-25 NOTE — PROGRESS NOTES
"  Infectious Diseases Progress Note    Yobany Sharma MD     [facility]  [place]: 6 days  Patient Identification:  Name: Loreto Green  Age: 76 y.o.  Sex: female  :  1945  MRN: [id number]         Primary Care Physician: Provider, No Known            Subjective: continue to do well  Interval History: See consultation note.    Objective:    Scheduled Meds:albuterol sulfate HFA, 2 puff, Inhalation, 4x Daily - RT  ampicillin-sulbactam, 3 g, Intravenous, Q6H  enoxaparin, 40 mg, Subcutaneous, Q24H  sodium chloride, 10 mL, Intravenous, Q12H      Continuous Infusions:     Vital signs in last 24 hours:  Temp:  [97.6 °F (36.4 °C)-99.1 °F (37.3 °C)] 97.6 °F (36.4 °C)  Heart Rate:  [88-92] 91  Resp:  [14-20] 20  BP: (116-141)/(65-94) 138/69    Intake/Output:    Intake/Output Summary (Last 24 hours) at 2021 1130  Last data filed at 2021 1000  Gross per 24 hour   Intake 690 ml   Output 1350 ml   Net -660 ml       Exam:  /69 (BP Location: Left arm, Patient Position: Lying)   Pulse 91   Temp 97.6 °F (36.4 °C) (Oral)   Resp 20   Ht 154.9 cm (61\")   Wt 64.8 kg (142 lb 13.7 oz)   SpO2 97%   BMI 26.99 kg/m²   Patient is examined using the personal protective equipment as per guidelines from infection control for this particular patient as enacted.  Hand washing was performed before and after patient interaction.  General Appearance:  Alert comfortable sitting in the chair and does not appear to be toxic or septic. Able to maintain oxygen saturation greater than 94% on room air.  Head:    Normocephalic, without obvious abnormality, atraumatic   Eyes:    PERRL, conjunctiva/corneas clear, EOM's intact, both eyes   Ears:    Normal external ear canals, both ears   Nose:   Nares normal, septum midline, mucosa normal, no drainage    or sinus tenderness   Throat:   Lips, tongue, gums normal; oral mucosa pink and moist   Neck:   Supple, symmetrical, trachea midline, no adenopathy;     thyroid:  no " enlargement/tenderness/nodules; no carotid    bruit or JVD   Back:     Symmetric, no curvature, ROM normal, no CVA tenderness   Lungs:    Decreased breath sounds bilaterally no obvious use of accessory muscles of breathing noted   Chest Wall:    No tenderness or deformity    Heart:    Regular rate and rhythm, S1 and S2 normal, no murmur, rub   or gallop   Abdomen:    Soft nontender   Extremities:  Deformed extremities from rheumatoid arthritis and prior fracture of the right humerus with deformity.   Pulses:   Pulses palpable in all extremities; symmetric all extremities   Skin:   Skin color normal, Skin is warm and dry,  no rashes or palpable lesions   Neurologic:  Grossly nonfocal            Data Review:    I reviewed the patient's new clinical results.  Results from last 7 days   Lab Units 08/24/21  0731 08/23/21  1309 08/21/21  0706 08/20/21  0851 08/19/21  1501   WBC 10*3/mm3 9.62 10.24 7.20 8.06 14.62*   HEMOGLOBIN g/dL 9.9* 11.0* 10.3* 10.8* 14.8   PLATELETS 10*3/mm3 633* 582* 369 388 442     Results from last 7 days   Lab Units 08/24/21  0731 08/23/21  1309 08/21/21  0706 08/20/21  0851 08/19/21  1617   SODIUM mmol/L 140 140 139 144 144   POTASSIUM mmol/L 3.7 3.2* 3.7 3.4* 3.6   CHLORIDE mmol/L 108* 108* 111* 110* 106   CO2 mmol/L 23.0 20.9* 19.0* 23.3 25.6   BUN mg/dL 5* 6* 16 33* 38*   CREATININE mg/dL 0.34* 0.51* 0.51* 0.68 0.94   CALCIUM mg/dL 7.4* 7.6* 8.0* 8.1* 9.7   GLUCOSE mg/dL 72 115* 82 83 96     No radiology results for the last 30 days.  Microbiology Results (last 10 days)     Procedure Component Value - Date/Time    Blood Culture - Blood, Arm, Left [[id number]] Collected: 08/19/21 1617    Lab Status: Final result Specimen: Blood from Arm, Left Updated: 08/24/21 1630     Blood Culture No growth at 5 days    Blood Culture - Blood, Arm, Left [[id number]] Collected: 08/19/21 1552    Lab Status: Final result Specimen: Blood from Arm, Left Updated: 08/24/21 1600     Blood Culture No growth at 5 days     Narrative:            Respiratory Panel PCR w/COVID-19(SARS-CoV-2) VINAYAK/GATO/JABARI/PAD/COR/MAD/BETTY In-House, NP Swab in UTM/VTM, 3-4 HR TAT - Swab, Nasopharynx [[id number]]  (Abnormal) Collected: 08/19/21 1434    Lab Status: Final result Specimen: Swab from Nasopharynx Updated: 08/19/21 1826     ADENOVIRUS, PCR Not Detected     Coronavirus 229E Not Detected     Coronavirus HKU1 Not Detected     Coronavirus NL63 Not Detected     Coronavirus OC43 Not Detected     COVID19 Detected     Human Metapneumovirus Not Detected     Human Rhinovirus/Enterovirus Not Detected     Influenza A PCR Not Detected     Influenza B PCR Not Detected     Parainfluenza Virus 1 Not Detected     Parainfluenza Virus 2 Not Detected     Parainfluenza Virus 3 Not Detected     Parainfluenza Virus 4 Not Detected     RSV, PCR Detected     Bordetella pertussis pcr Not Detected     Bordetella parapertussis PCR Not Detected     Chlamydophila pneumoniae PCR Not Detected     Mycoplasma pneumo by PCR Not Detected    Narrative:      In the setting of a positive respiratory panel with a viral infection PLUS a negative procalcitonin without other underlying concern for bacterial infection, consider observing off antibiotics or discontinuation of antibiotics and continue supportive care. If the respiratory panel is positive for atypical bacterial infection (Bordetella pertussis, Chlamydophila pneumoniae, or Mycoplasma pneumoniae), consider antibiotic de-escalation to target atypical bacterial infection.            Assessment: Continues to improve.    Pneumonia due to COVID-19 virus    RSV infection    Hypokalemia    Rheumatoid arthritis involving multiple sites (CMS/HCC)    Immunosuppression (CMS/HCC)    Cytokine release syndrome, grade 1  1-right upper lung bacterial pneumonia improving on antibiotic therapy  2-dual respiratory viral infection consisting of COVID-19 and RSV with no significant respiratory compromise and able to maintain oxygen saturation greater  than 94% on room air  3-immunocompromised host with history of rheumatoid arthritis  4-history of hypertension.  5-deformity of right upper extremity        Recommendations/Discussions:  · It appears patient has done well with initial management plan at the time of admission.    · Would recommend continuation of IV Unasyn and changed to oral Augmentin to complete the treatment.  · Since she is not hypoxic and doing well she does not need any Covid specific treatment.  · Agree with plans for repeating imaging studies of her chest in few months and may benefit from pulmonary evaluation so that she can have a plan for follow-up on lung findings going forward.  · Would recommend 7 days of antibiotic treatment while closely monitoring her for oxygen needs.  · Discussed with Dr. Craig.    Yobany Sharma MD  8/25/2021  11:30 EDT    Much of this encounter note is an electronic transcription/translation of spoken language to printed text. The electronic translation of spoken language may permit erroneous, or at times, nonsensical words or phrases to be inadvertently transcribed; Although I have reviewed the note for such errors, some may still exist

## 2021-08-25 NOTE — DISCHARGE SUMMARY
[facility]  [phone]    DISCHARGE SUMMARY  [facility]    Patient Identification:  Name: Loreto Green  Age: 76 y.o.  Sex: female  :  1945  MRN: [id number]  Primary Care Physician: Provider, No Known    Admit date: 2021  Discharge date and time:      Discharge Diagnoses:  Pneumonia due to COVID-19 virus    RSV infection    Hypokalemia    Rheumatoid arthritis involving multiple sites (CMS/HCC)    Immunosuppression (CMS/HCC)    Cytokine release syndrome, grade 1       History of present illness from H&P:    Patient is a 76-year-old female that feels remarkable for rheumatoid arthritis since the age of 20 or21 and has been on various treatment and currently she is on Enbrel infusion on a monthly basis, history of latent hepatitis B and chronic hepatitis C and currently on antiviral treatment - entecavir -under the care of Dr. Longoria and follows with [facility] that last visit with them in May 2021 when she was considered to be doing well and plan was for her to follow-up in 6 months on her usual state of health global week and a half ago when she started having increasing cough shortness of breath fatigue nausea vomiting and diarrhea.  She has been getting progressively weak.  She has not been vaccinated against COVID-19.  Patient also tested with COVID-19 resulting in her getting concerned that her symptoms could be due to Covid.  EMS was called and patient was brought to the emergency room for further evaluation where work-up revealed dense consolidation in the right upper lobe concerning for pneumonia and a possible underlying mass.  A respiratory viral panel come back positive for COVID-19 as well as RSV.  Patient was noted to be tachycardic upon presentation but was able to maintain her oxygen saturation above 94% on room air.  Patient has been appropriately started on antibacterial treatment for possible  postobstructive/community-acquired pneumonia involving the right upper lung and is being admitted for further care.    Hospital Course:     Patient was thought to have a bacterial pneumonia on top of covid - 19 and rsv infection, she was given unasyn and will finish Augmentin on discharge.  Patient had a markedly elevated pro Thanh and CRP levels which are trending down.  She was seen by infestious disease. She will need repeat CT of chest scan in 4 to 6 weeks to ensure resolution of infiltrate and make sure adenopathy is improved.  Given no hypoxemia we held on dexamethasone and remdesivir as far as covid was concerned.    Patient worked with physical therapy walked well in the in room and maintained sats on RA.    Speech therapy note reviewed and ok with soft texture diet.    Patient will need follow-up with Dr. Mahoney outpatient for rheumatoid arthritis and recommendation on rate starting her rheumatoid medications.    Was discussed with CCP.  And patient felt to be safe to go home.       The patient was seen and examined on the day of discharge.    Consults:   Consults     Date and Time Order Name Status Description    8/22/2021 11:10 AM Inpatient Infectious Diseases Consult Completed     8/19/2021  3:22 PM [facility] (on-call MD unless specified) Details Completed           Results from last 7 days   Lab Units 08/24/21  0731   WBC 10*3/mm3 9.62   HEMOGLOBIN g/dL 9.9*   HEMATOCRIT % 29.4*   PLATELETS 10*3/mm3 633*       Results from last 7 days   Lab Units 08/24/21  0731   SODIUM mmol/L 140   POTASSIUM mmol/L 3.7   CHLORIDE mmol/L 108*   CO2 mmol/L 23.0   BUN mg/dL 5*   CREATININE mg/dL 0.34*   GLUCOSE mg/dL 72   CALCIUM mg/dL 7.4*       Significant Diagnostic Studies:   WBC   Date Value Ref Range Status   08/24/2021 9.62 3.40 - 10.80 10*3/mm3 Final     Hemoglobin   Date Value Ref Range Status   08/24/2021 9.9 (L) 12.0 - 15.9 g/dL Final     Hematocrit   Date Value Ref Range Status   08/24/2021 29.4 (L) 34.0 - 46.6 % Final      Platelets   Date Value Ref Range Status   08/24/2021 633 (H) 140 - 450 10*3/mm3 Final     Sodium   Date Value Ref Range Status   08/24/2021 140 136 - 145 mmol/L Final     Potassium   Date Value Ref Range Status   08/24/2021 3.7 3.5 - 5.2 mmol/L Final     Chloride   Date Value Ref Range Status   08/24/2021 108 (H) 98 - 107 mmol/L Final     CO2   Date Value Ref Range Status   08/24/2021 23.0 22.0 - 29.0 mmol/L Final     BUN   Date Value Ref Range Status   08/24/2021 5 (L) 8 - 23 mg/dL Final     Creatinine   Date Value Ref Range Status   08/24/2021 0.34 (L) 0.57 - 1.00 mg/dL Final     Glucose   Date Value Ref Range Status   08/24/2021 72 65 - 99 mg/dL Final     Calcium   Date Value Ref Range Status   08/24/2021 7.4 (L) 8.6 - 10.5 mg/dL Final     AST (SGOT)   Date Value Ref Range Status   08/23/2021 32 1 - 32 U/L Final     ALT (SGPT)   Date Value Ref Range Status   08/23/2021 29 1 - 33 U/L Final     Alkaline Phosphatase   Date Value Ref Range Status   08/23/2021 76 39 - 117 U/L Final     No results found for: APTT, INR  No results found for: COLORU, CLARITYU, SPECGRAV, PHUR, PROTEINUR, GLUCOSEU, KETONESU, BLOODU, NITRITE, LEUKOCYTESUR, BILIRUBINUR, UROBILINOGEN, RBCUA, WBCUA, BACTERIA, UACOMMENT  No results found for: TROPONINT, TROPONINI, BNP  No components found for: HGBA1C;2  No components found for: TSH;2    Imaging Results (All)     Procedure Component Value Units Date/Time    CT Chest Without Contrast Diagnostic [[id number]] Collected: 08/20/21 1235     Updated: 08/20/21 1449    Narrative:      CT CHEST WITHOUT CONTRAST DIAGNOSTIC     Radiation dose reduction techniques were utilized, including automated  exposure control and exposure modulation based on body size.     CLINICAL INFORMATION: Lung mass.     Correlation with chest radiograph 08/19/2021.     FINDINGS: There are patchy multifocal areas of ground glass infiltrate  demonstrated within the right and left lung periphery. This is involving  the right  upper lobe to the greatest degree where there is associated  areas of consolidation. In the left lower lobe on image #59 there is a 5  mm noncalcified pulmonary nodule. No pleural effusion identified.  Cardiac size upper limits of normal, there is coronary artery  calcification with atherosclerotic calcification of the aorta. There are  several mildly enlarged mediastinal lymph nodes seen, reference node in  the precarinal space is 11 mm short axis. Very small lymph nodes  demonstrated within the right supraclavicular region and thoracic inlet.  Left axillary lymph nodes are normal in appearance, the breast  parenchyma is symmetric and satisfactory in appearance. There is a  solitary enlarged pathologic right axillary lymph node measuring 17 x 13  mm. This is likely secondary to the right elbow joint replacement.     CONCLUSION: Patchy multifocal areas of infiltrate demonstrated within  the periphery of both lungs, most pronounced in the right upper lobe  where there are areas of consolidation. The overall appearance is most  worrisome for underlying COVID pneumonia. Other atypical viral pneumonia  not excluded. Enlarged mediastinal and right axillary nodes as described  above.        This report was finalized on 8/20/2021 2:46 PM by Dr. Nirav Mitchell M.D.       XR Chest 1 View [[id number]] Collected: 08/19/21 1443     Updated: 08/19/21 1451    Narrative:      PORTABLE CHEST 08/19/2021 AT 2:27 PM     CLINICAL HISTORY: CHF. COPD.     There is no previous chest x-ray for comparison.     There is a large approximately 8.4 x 6.4 cm diameter focus of dense  consolidation within the right upper lobe consistent with pneumonia. An  underlying mass in this region cannot be excluded. Continued chest x-ray  follow-up is recommended to document complete clearing with appropriate  medical treatment. The left lung is well expanded and clear. There are  no pleural effusions. The cardiomediastinal silhouette is  unremarkable.  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 The humeral stem component of  the prosthesis has eroded through the lateral margin of the humerus, and  is located within the soft tissues of the forearm. There is marked  erosion of the humeral head and glenoid fossa, and the humerus itself  has an overall gracile appearance.     IMPRESSIONS: Dense consolidation in the right upper lobe consistent with  pneumonia as described in more detail above.     This report was finalized on 8/19/2021 2:48 PM by Dr. Burak Wheeler M.D.         No results found for: SITE, ALLENTEST, PHART, MER7XIC, PO2ART, IHL1MME, BASEEXCESS, M4DRRFXK, HGBBG, HCTABG, OXYHEMOGLOBI, METHHGBN, CARBOXYHGB, CO2CT, BAROMETRIC, MODALITY, FIO2       Discharge Medications      New Medications      Instructions Start Date   acetaminophen 325 MG tablet  Commonly known as: TYLENOL   650 mg, Oral, Every 4 Hours PRN      amoxicillin-clavulanate 875-125 MG per tablet  Commonly known as: Augmentin   1 tablet, Oral, 2 Times Daily         Continue These Medications      Instructions Start Date   loratadine 10 MG tablet  Commonly known as: CLARITIN   10 mg, Oral, Daily         Stop These Medications    Enbrel SureClick 50 MG/ML solution auto-injector  Generic drug: Etanercept     entecavir 0.5 MG tablet  Commonly known as: BARACLUDE            Patient Instructions:       No future appointments.     Follow-up Information     Provider, No Known .    Contact information:  [facility] [address] [phone]                   Discharge Order (From admission, onward)     Start     Ordered    08/25/21 1036  Discharge patient  Once     Expected Discharge Date: 08/25/21    Discharge Disposition: Home or Self Care    Physician of Record for Attribution - Please select from Treatment Team: HUE MARROQUIN [[id number]]    Review needed by CMO to determine Physician of Record: No       Question Answer Comment   Physician of Record for Attribution -  Please select from Treatment Team ARCHIE CRAIG    Review needed by CMO to determine Physician of Record No        08/25/21 1037                Diet Order   Procedures   • Diet Soft Texture; [employer]; Thin         Discharge instructions:  Follow up with your primary care provider in 1-2 weeks with a cbc and cmp     CT chest with PMD 4-6 Weeks    Total time spent discharging patient including evaluation, post hospitalization follow up,  medication and post hospitalization instructions and education, total time exceeds 30 minutes.    Signed:  Archie Craig MD  8/25/2021  10:38 EDT

## 2021-08-25 NOTE — CASE MANAGEMENT/SOCIAL WORK
Continued Stay Note  [facility]     Patient Name: Loreto Green  MRN: [id number]  Today's Date: 8/25/2021    Admit Date: 8/19/2021    Discharge Plan     Row Name 08/25/21 1401       Plan    Plan Comments  DC orders in EPIC.  Spoke with Ingrid/[facility] and she is aware.  She has arranged for patient to become established with [facility] so that there is a PCP to sign orders.  Family to transport home. Alice Bauer RN        Discharge Codes    No documentation.       Expected Discharge Date and Time     Expected Discharge Date Expected Discharge Time    Aug 25, 2021             Alice Bauer RN

## 2021-08-25 NOTE — ED PROVIDER NOTES
MD ATTESTATION NOTE    The ALEJANDRO and I have discussed this patient's history, physical exam, and treatment plan.  I have reviewed the documentation and personally had a face to face interaction with the patient. I affirm the documentation and agree with the treatment and plan.  The attached note describes my personal findings.      Loreto Green is a 76 y.o. female who presents to the ED c/o fatigue. Onset x days. Daughter tested positive       On exam:  Tachycardic, reg rhythm  Bilateral rhonchi    Labs  Recent Results (from the past 24 hour(s))   Basic Metabolic Panel    Collection Time: 08/24/21  7:31 AM    Specimen: Blood   Result Value Ref Range    Glucose 72 65 - 99 mg/dL    BUN 5 (L) 8 - 23 mg/dL    Creatinine 0.34 (L) 0.57 - 1.00 mg/dL    Sodium 140 136 - 145 mmol/L    Potassium 3.7 3.5 - 5.2 mmol/L    Chloride 108 (H) 98 - 107 mmol/L    CO2 23.0 22.0 - 29.0 mmol/L    Calcium 7.4 (L) 8.6 - 10.5 mg/dL    eGFR  African Amer >150 >60 mL/min/1.73    BUN/Creatinine Ratio 14.7 7.0 - 25.0    Anion Gap 9.0 5.0 - 15.0 mmol/L   CBC Auto Differential    Collection Time: 08/24/21  7:31 AM    Specimen: Blood   Result Value Ref Range    WBC 9.62 3.40 - 10.80 10*3/mm3    RBC 3.23 (L) 3.77 - 5.28 10*6/mm3    Hemoglobin 9.9 (L) 12.0 - 15.9 g/dL    Hematocrit 29.4 (L) 34.0 - 46.6 %    MCV 91.0 79.0 - 97.0 fL    MCH 30.7 26.6 - 33.0 pg    MCHC 33.7 31.5 - 35.7 g/dL    RDW 12.2 (L) 12.3 - 15.4 %    RDW-SD 39.8 37.0 - 54.0 fl    MPV 9.9 6.0 - 12.0 fL    Platelets 633 (H) 140 - 450 10*3/mm3   Manual Differential    Collection Time: 08/24/21  7:31 AM    Specimen: Blood   Result Value Ref Range    Neutrophil % 79.8 (H) 42.7 - 76.0 %    Lymphocyte % 10.1 (L) 19.6 - 45.3 %    Monocyte % 8.1 5.0 - 12.0 %    Myelocyte % 2.0 (H) 0.0 - 0.0 %    Neutrophils Absolute 7.68 (H) 1.70 - 7.00 10*3/mm3    Lymphocytes Absolute 0.97 0.70 - 3.10 10*3/mm3    Monocytes Absolute 0.78 0.10 - 0.90 10*3/mm3    Poikilocytes Mod/2+ None Seen    WBC  Morphology Normal Normal    Platelet Morphology Normal Normal       Radiology  No Radiology Exams Resulted Within Past 24 Hours    Medical Decision Making:  ED Course as of Aug 24 2204   Thu Aug 19, 2021   1456 Heart Rate(!): 128 [DC]   1457 I viewed CXR on PACS system.  My interpretation is no pneumothorax.        [DC]   1459 EKG interpreted by myself.  Time 1428.  Sinus rhythm.  Heart 119.  Normal axis.  Normal intervals.  No acute ST abnormality.    [TD]   1515 WBC(!): 14.62 [DC]   1544 I spoke with MD Zachary at this time regarding the patient.  I discussed work-up, results, concerns.  I discussed the consulting provider's desire for tele admit.        [DC]      ED Course User Index  [DC] Burak Mcfarland PA  [TD] Carl Wright II, MD           PPE: Both the patient and I wore a surgical mask throughout the entire patient encounter. I wore protective goggles.     Diagnosis  Final diagnoses:   Community acquired pneumonia of right upper lobe of lung   Sepsis without acute organ dysfunction, due to unspecified organism (CMS/[facility])        Carl Wright II, MD  08/24/21 2209

## 2021-08-26 ENCOUNTER — READMISSION MANAGEMENT (OUTPATIENT)
Dept: CALL CENTER | Facility: HOSPITAL | Age: 76
End: 2021-08-26

## 2021-08-26 ENCOUNTER — HOME CARE VISIT (OUTPATIENT)
Dept: HOME HEALTH SERVICES | Facility: HOME HEALTHCARE | Age: 76
End: 2021-08-26

## 2021-08-26 NOTE — CASE MANAGEMENT/SOCIAL WORK
Case Management Discharge Note      Final Note: DC'd home 8/25 with [facility] following             Home Medical Care Coordination complete.    Service Provider Selected Services Address Phone Fax Patient Preferred    [facility]  [facility] [address] [address] [phone] --                  Transportation Services  Private: Car    Final Discharge Disposition Code: 06 - home with home health care

## 2021-08-26 NOTE — OUTREACH NOTE
COVID-19 Week 1 Survey      Responses   [facility] patient discharged from?  [place]   Does the patient have one of the following disease processes/diagnoses(primary or secondary)?  COVID-19   COVID-19 underlying condition?  None   Call Number  Call 1   Week 1 Call successful?  Yes   Call start time  0945   Call end time  0953   Discharge diagnosis  Pneumonia due to COVID-19 virus   Person spoke with today (if not patient) and relationship  Patient   Meds reviewed with patient/caregiver?  Yes   Is the patient having any side effects they believe may be caused by any medication additions or changes?  No   Does the patient have all medications ordered at discharge?  Yes   Is the patient taking all medications as directed (includes completed medication regime)?  Yes   Does the patient have a primary care provider?   No   PCP Nursing Intervention  Provided number to obtain PCP   Does the patient have an appointment with their PCP or specialist within 7 days of discharge?  No   What is preventing the patient from scheduling follow up appointments within 7 days of discharge?  Unsure of when or with whom   Nursing Interventions  Educated patient on importance of making appointment, Advised patient to make appointment   Has the patient kept scheduled appointments due by today?  N/A   What is the Home health agency?   [facility]   Has home health visited the patient within 72 hours of discharge?  N/A   Psychosocial issues?  No   Did the patient receive a copy of their discharge instructions?  Yes   Did the patient receive a copy of COVID-19 specific instructions?  Yes   Nursing interventions  Reviewed instructions with patient   What is the patient's perception of their health status since discharge?  Improving   Does the patient have any of the following symptoms?  Cough   Nursing Interventions  Nurse provided patient education   Pulse Ox monitoring  None   Is the patient/caregiver able to teach back steps to recovery at  home?  Set small, achievable goals for return to baseline health, Rest and rebuild strength, gradually increase activity, Eat a well-balance diet   Is the patient/caregiver able to teach back the hierarchy of who to call/visit for symptoms/problems? PCP, Specialist, Home health nurse, Urgent Care, ED, 911  Yes   COVID-19 call completed?  Yes   Wrap up additional comments  Pt states she is doing ok,  still has cough, and feeling tired. No questions/concerns.          Payton Will RN

## 2021-08-26 NOTE — PAYOR COMM NOTE
"Loreto Barahona (76 y.o. Female)     PLEASE SEE ATTACHED DC SUMMARY      REF#[id number]    THANK YOU    LUPIS HSU LPN CCP    Date of Birth Social Security Number Address Home Phone MRN    1945  [address]  [address]  [facility] [address] [phone] [id number]    Christian Marital Status          [facility] [place]       Admission Date Admission Type Admitting Provider Attending Provider Department, Room/Bed    8/19/21 Emergency Yobany Sharma MD  [address], S612/1    Discharge Date Discharge Disposition Discharge Destination        8/25/2021 Home or Self Care              Attending Provider: (none)   Allergies: No Known Allergies    Isolation: None   Infection: RSV (08/19/21), COVID (confirmed) (08/19/21)   Code Status: Prior    Ht: 154.9 cm (61\")   Wt: 64.8 kg (142 lb 13.7 oz)    Admission Cmt: None   Principal Problem: Pneumonia due to COVID-19 virus [U07.1,J12.82]                 Active Insurance as of 8/19/2021     Primary Coverage     Payor Plan Insurance Group Employer/Plan Group    MEDICARE MEDICARE B ONLY      Payor Plan Address Payor Plan Phone Number Payor Plan Fax Number Effective Dates    [address] [address] [phone]  8/1/2012 - None Entered    [facility] [id number]       Subscriber Name Subscriber Birth Date Member ID       LORETO BARAHONA 1945 [id number]           Secondary Coverage     Payor Plan Insurance Group Employer/Plan Group    [place] [id number]     Payor Plan Address Payor Plan Phone Number Payor Plan Fax Number Effective Dates    [employer] [address] [address] [phone]  7/1/2021 - None Entered    [facility] [id number]       Subscriber Name Subscriber Birth Date Member ID       LORETO BARAHONA 1945 [id number]                 Emergency Contacts      (Rel.) Home Phone Work Phone Mobile Phone    CLAUDE WEI (Daughter) [phone] -- --    Monserrat Espinal (Daughter) [phone] -- --             "   Discharge Summary      Archie Craig MD at [address] [place], [place] [address]  [phone]    DISCHARGE SUMMARY  [facility]    Patient Identification:  Name: Loreto Green  Age: 76 y.o.  Sex: female  :  1945  MRN: [id number]  Primary Care Physician: Provider, No Known    Admit date: 2021  Discharge date and time:      Discharge Diagnoses:  Pneumonia due to COVID-19 virus    RSV infection    Hypokalemia    Rheumatoid arthritis involving multiple sites (CMS/HCC)    Immunosuppression (CMS/HCC)    Cytokine release syndrome, grade 1       History of present illness from H&P:    Patient is a 76-year-old female that feels remarkable for rheumatoid arthritis since the age of 20 or21 and has been on various treatment and currently she is on Enbrel infusion on a monthly basis, history of latent hepatitis B and chronic hepatitis C and currently on antiviral treatment - entecavir -under the care of Dr. Longoria and follows with [facility] that last visit with them in May 2021 when she was considered to be doing well and plan was for her to follow-up in 6 months on her usual state of health global week and a half ago when she started having increasing cough shortness of breath fatigue nausea vomiting and diarrhea.  She has been getting progressively weak.  She has not been vaccinated against COVID-19.  Patient also tested with COVID-19 resulting in her getting concerned that her symptoms could be due to Covid.  EMS was called and patient was brought to the emergency room for further evaluation where work-up revealed dense consolidation in the right upper lobe concerning for pneumonia and a possible underlying mass.  A respiratory viral panel come back positive for COVID-19 as well as RSV.  Patient was noted to be tachycardic upon presentation but was able to maintain her oxygen saturation above 94% on room air.  Patient has been  appropriately started on antibacterial treatment for possible postobstructive/community-acquired pneumonia involving the right upper lung and is being admitted for further care.    Hospital Course:     Patient was thought to have a bacterial pneumonia on top of covid - 19 and rsv infection, she was given unasyn and will finish Augmentin on discharge.  Patient had a markedly elevated pro Thanh and CRP levels which are trending down.  She was seen by [facility] disease. She will need repeat CT of chest scan in 4 to 6 weeks to ensure resolution of infiltrate and make sure adenopathy is improved.  Given no hypoxemia we held on dexamethasone and remdesivir as far as covid was concerned.    Patient worked with physical therapy walked well in the in room and maintained sats on RA.    Speech therapy note reviewed and ok with soft texture diet.    Patient will need follow-up with Dr. Mahoney outpatient for rheumatoid arthritis and recommendation on rate starting her rheumatoid medications.    Was discussed with CCP.  And patient felt to be safe to go home.       The patient was seen and examined on the day of discharge.    Consults:   Consults     Date and Time Order Name Status Description    8/22/2021 11:10 AM Inpatient Infectious Diseases Consult Completed     8/19/2021  3:22 PM [facility] (on-call MD unless specified) Details Completed           Results from last 7 days   Lab Units 08/24/21  0731   WBC 10*3/mm3 9.62   HEMOGLOBIN g/dL 9.9*   HEMATOCRIT % 29.4*   PLATELETS 10*3/mm3 633*       Results from last 7 days   Lab Units 08/24/21  0731   SODIUM mmol/L 140   POTASSIUM mmol/L 3.7   CHLORIDE mmol/L 108*   CO2 mmol/L 23.0   BUN mg/dL 5*   CREATININE mg/dL 0.34*   GLUCOSE mg/dL 72   CALCIUM mg/dL 7.4*       Significant Diagnostic Studies:   WBC   Date Value Ref Range Status   08/24/2021 9.62 3.40 - 10.80 10*3/mm3 Final     Hemoglobin   Date Value Ref Range Status   08/24/2021 9.9 (L) 12.0 - 15.9 g/dL Final     Hematocrit   Date  Value Ref Range Status   08/24/2021 29.4 (L) 34.0 - 46.6 % Final     Platelets   Date Value Ref Range Status   08/24/2021 633 (H) 140 - 450 10*3/mm3 Final     Sodium   Date Value Ref Range Status   08/24/2021 140 136 - 145 mmol/L Final     Potassium   Date Value Ref Range Status   08/24/2021 3.7 3.5 - 5.2 mmol/L Final     Chloride   Date Value Ref Range Status   08/24/2021 108 (H) 98 - 107 mmol/L Final     CO2   Date Value Ref Range Status   08/24/2021 23.0 22.0 - 29.0 mmol/L Final     BUN   Date Value Ref Range Status   08/24/2021 5 (L) 8 - 23 mg/dL Final     Creatinine   Date Value Ref Range Status   08/24/2021 0.34 (L) 0.57 - 1.00 mg/dL Final     Glucose   Date Value Ref Range Status   08/24/2021 72 65 - 99 mg/dL Final     Calcium   Date Value Ref Range Status   08/24/2021 7.4 (L) 8.6 - 10.5 mg/dL Final     AST (SGOT)   Date Value Ref Range Status   08/23/2021 32 1 - 32 U/L Final     ALT (SGPT)   Date Value Ref Range Status   08/23/2021 29 1 - 33 U/L Final     Alkaline Phosphatase   Date Value Ref Range Status   08/23/2021 76 39 - 117 U/L Final     No results found for: APTT, INR  No results found for: COLORU, CLARITYU, SPECGRAV, PHUR, PROTEINUR, GLUCOSEU, KETONESU, BLOODU, NITRITE, LEUKOCYTESUR, BILIRUBINUR, UROBILINOGEN, RBCUA, WBCUA, BACTERIA, UACOMMENT  No results found for: TROPONINT, TROPONINI, BNP  No components found for: HGBA1C;2  No components found for: TSH;2    Imaging Results (All)     Procedure Component Value Units Date/Time    CT Chest Without Contrast Diagnostic [[id number]] Collected: 08/20/21 1235     Updated: 08/20/21 1449    Narrative:      CT CHEST WITHOUT CONTRAST DIAGNOSTIC     Radiation dose reduction techniques were utilized, including automated  exposure control and exposure modulation based on body size.     CLINICAL INFORMATION: Lung mass.     Correlation with chest radiograph 08/19/2021.     FINDINGS: There are patchy multifocal areas of ground glass infiltrate  demonstrated within  the right and left lung periphery. This is involving  the right upper lobe to the greatest degree where there is associated  areas of consolidation. In the left lower lobe on image #59 there is a 5  mm noncalcified pulmonary nodule. No pleural effusion identified.  Cardiac size upper limits of normal, there is coronary artery  calcification with atherosclerotic calcification of the aorta. There are  several mildly enlarged mediastinal lymph nodes seen, reference node in  the precarinal space is 11 mm short axis. Very small lymph nodes  demonstrated within the right supraclavicular region and thoracic inlet.  Left axillary lymph nodes are normal in appearance, the breast  parenchyma is symmetric and satisfactory in appearance. There is a  solitary enlarged pathologic right axillary lymph node measuring 17 x 13  mm. This is likely secondary to the right elbow joint replacement.     CONCLUSION: Patchy multifocal areas of infiltrate demonstrated within  the periphery of both lungs, most pronounced in the right upper lobe  where there are areas of consolidation. The overall appearance is most  worrisome for underlying COVID pneumonia. Other atypical viral pneumonia  not excluded. Enlarged mediastinal and right axillary nodes as described  above.        This report was finalized on 8/20/2021 2:46 PM by Dr. Nirav Mitchell M.D.       XR Chest 1 View [[id number]] Collected: 08/19/21 1443     Updated: 08/19/21 1451    Narrative:      PORTABLE CHEST 08/19/2021 AT 2:27 PM     CLINICAL HISTORY: CHF. COPD.     There is no previous chest x-ray for comparison.     There is a large approximately 8.4 x 6.4 cm diameter focus of dense  consolidation within the right upper lobe consistent with pneumonia. An  underlying mass in this region cannot be excluded. Continued chest x-ray  follow-up is recommended to document complete clearing with appropriate  medical treatment. The left lung is well expanded and clear. There are  no pleural  effusions. The cardiomediastinal silhouette is unremarkable.  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 The humeral stem component of  the prosthesis has eroded through the lateral margin of the humerus, and  is located within the soft tissues of the forearm. There is marked  erosion of the humeral head and glenoid fossa, and the humerus itself  has an overall gracile appearance.     IMPRESSIONS: Dense consolidation in the right upper lobe consistent with  pneumonia as described in more detail above.     This report was finalized on 8/19/2021 2:48 PM by Dr. Burak Wheeler M.D.         No results found for: SITE, ALLENTEST, PHART, JRQ5LLK, PO2ART, XMV2TDE, BASEEXCESS, X2CJDBBT, HGBBG, HCTABG, OXYHEMOGLOBI, METHHGBN, CARBOXYHGB, CO2CT, BAROMETRIC, MODALITY, FIO2       Discharge Medications      New Medications      Instructions Start Date   acetaminophen 325 MG tablet  Commonly known as: TYLENOL   650 mg, Oral, Every 4 Hours PRN      amoxicillin-clavulanate 875-125 MG per tablet  Commonly known as: Augmentin   1 tablet, Oral, 2 Times Daily         Continue These Medications      Instructions Start Date   loratadine 10 MG tablet  Commonly known as: CLARITIN   10 mg, Oral, Daily         Stop These Medications    Enbrel SureClick 50 MG/ML solution auto-injector  Generic drug: Etanercept     entecavir 0.5 MG tablet  Commonly known as: BARACLUDE            Patient Instructions:       No future appointments.     Follow-up Information     Provider, No Known .    Contact information:  [facility] [address] [phone]                   Discharge Order (From admission, onward)     Start     Ordered    08/25/21 1036  Discharge patient  Once     Expected Discharge Date: 08/25/21    Discharge Disposition: Home or Self Care    Physician of Record for Attribution - Please select from Treatment Team: HUE MARROQUIN [[id number]]    Review needed by CMO to determine Physician of Record: No       Question Answer  Comment   Physician of Record for Attribution - Please select from Treatment Team ARCHIE CRAIG    Review needed by CMO to determine Physician of Record No        08/25/21 1037                Diet Order   Procedures   • Diet Soft Texture; [employer]; Thin         Discharge instructions:  Follow up with your primary care provider in 1-2 weeks with a cbc and cmp     CT chest with PMD 4-6 Weeks    Total time spent discharging patient including evaluation, post hospitalization follow up,  medication and post hospitalization instructions and education, total time exceeds 30 minutes.    Signed:  Archie Craig MD  8/25/2021  10:38 EDT    Electronically signed by Archie Craig MD at 08/25/21 7227

## 2021-08-27 ENCOUNTER — READMISSION MANAGEMENT (OUTPATIENT)
Dept: CALL CENTER | Facility: HOSPITAL | Age: 76
End: 2021-08-27

## 2021-08-27 NOTE — OUTREACH NOTE
COVID-19 Week 1 Survey      Responses   [facility] patient discharged from?  [place]   Does the patient have one of the following disease processes/diagnoses(primary or secondary)?  COVID-19   COVID-19 underlying condition?  None   Call Number  Call 2   Week 1 Call successful?  Yes   Call start time  1150   Call end time  1152   Discharge diagnosis  Pneumonia due to COVID-19 virus   Meds reviewed with patient/caregiver?  Yes   Is the patient having any side effects they believe may be caused by any medication additions or changes?  No   Does the patient have all medications ordered at discharge?  Yes   Is the patient taking all medications as directed (includes completed medication regime)?  Yes   Does the patient have a primary care provider?   No   Comments regarding PCP  PATIENT STATES SHE IS WORKING ON GETTING A NEW PCP   Does the patient have an appointment with their PCP or specialist within 7 days of discharge?  No   What is preventing the patient from scheduling follow up appointments within 7 days of discharge?  Unsure of when or with whom   Nursing Interventions  Educated patient on importance of making appointment, Advised patient to make appointment   Has the patient kept scheduled appointments due by today?  N/A   What is the Home health agency?   [facility]   Has home health visited the patient within 72 hours of discharge?  No   Home health comments  HOME HEALTH NURSE VISIT NOTED IN EPIC TO BE TODAY   Psychosocial issues?  No   Did the patient receive a copy of their discharge instructions?  Yes   Did the patient receive a copy of COVID-19 specific instructions?  Yes   Nursing interventions  Reviewed instructions with patient   What is the patient's perception of their health status since discharge?  Improving   Does the patient have any of the following symptoms?  None   Nursing Interventions  Nurse provided patient education   Pulse Ox monitoring  None   Is the patient/caregiver able to teach back  steps to recovery at home?  Set small, achievable goals for return to baseline health, Rest and rebuild strength, gradually increase activity, Practice good oral hygiene, Eat a well-balance diet, Make a list of questions for provider's appointment   If the patient is a current smoker, are they able to teach back resources for cessation?  Not a smoker   Is the patient/caregiver able to teach back the hierarchy of who to call/visit for symptoms/problems? PCP, Specialist, Home health nurse, Urgent Care, ED, 911  Yes   COVID-19 call completed?  Yes          Mary Stewart LPN

## 2021-08-28 ENCOUNTER — READMISSION MANAGEMENT (OUTPATIENT)
Dept: CALL CENTER | Facility: HOSPITAL | Age: 76
End: 2021-08-28

## 2021-08-28 NOTE — OUTREACH NOTE
COVID-19 Week 1 Survey      Responses   [facility] patient discharged from?  [place]   Does the patient have one of the following disease processes/diagnoses(primary or secondary)?  COVID-19   COVID-19 underlying condition?  None [ Rheumatoid arthritis involving multiple sites (CMS/[facility])]   Call Number  Call 3   Week 1 Call successful?  Yes   Call start time  0955   Call end time  0959   Discharge diagnosis  Pneumonia due to COVID-19 virus   Meds reviewed with patient/caregiver?  Yes   Is the patient having any side effects they believe may be caused by any medication additions or changes?  No   Does the patient have all medications ordered at discharge?  Yes   Is the patient taking all medications as directed (includes completed medication regime)?  Yes   What is the Home health agency?   [facility]   Has home health visited the patient within 72 hours of discharge?  No   What is the patient's perception of their health status since discharge?  Same [patient has  good and bad days]   Pulse Ox monitoring  None [has no pulse ox]   COVID-19 call completed?  Yes   Wrap up additional comments  patient does not have a pulse ox, home health has called but not visitied as yet, having  good and bad days          Bria Henry RN

## 2021-08-31 ENCOUNTER — READMISSION MANAGEMENT (OUTPATIENT)
Dept: CALL CENTER | Facility: HOSPITAL | Age: 76
End: 2021-08-31

## 2021-08-31 NOTE — OUTREACH NOTE
COVID-19 Week 1 Survey      Responses   [facility] patient discharged from?  [place]   Does the patient have one of the following disease processes/diagnoses(primary or secondary)?  COVID-19   COVID-19 underlying condition?  None   Call Number  Call 4   Week 1 Call successful?  Yes   Call start time  1252   Call end time  1253   Discharge diagnosis  Pneumonia due to COVID-19 virus   Meds reviewed with patient/caregiver?  Yes   Is the patient having any side effects they believe may be caused by any medication additions or changes?  No   Does the patient have all medications ordered at discharge?  Yes   Is the patient taking all medications as directed (includes completed medication regime)?  Yes   Does the patient have a primary care provider?   Yes   Comments regarding PCP  PATIENT STATES SHE IS WORKING ON GETTING A NEW PCP   Does the patient have an appointment with their PCP or specialist within 7 days of discharge?  No   What is preventing the patient from scheduling follow up appointments within 7 days of discharge?  Unsure of when or with whom   Nursing Interventions  Educated patient on importance of making appointment, Advised patient to make appointment   Has the patient kept scheduled appointments due by today?  N/A   What is the [facility] health agency?   [facility]   Has home health visited the patient within 72 hours of discharge?  Call prior to 72 hours   Psychosocial issues?  No   Did the patient receive a copy of their discharge instructions?  Yes   Did the patient receive a copy of COVID-19 specific instructions?  Yes   Nursing interventions  Reviewed instructions with patient   What is the patient's perception of their health status since discharge?  Same   Does the patient have any of the following symptoms?  None   Nursing Interventions  Nurse provided patient education   Pulse Ox monitoring  None   Is the patient/caregiver able to teach back steps to recovery at home?  Set small, achievable goals  for return to baseline health, Rest and rebuild strength, gradually increase activity, Practice good oral hygiene, Eat a well-balance diet, Make a list of questions for provider's appointment   If the patient is a current smoker, are they able to teach back resources for cessation?  Not a smoker   Is the patient/caregiver able to teach back the hierarchy of who to call/visit for symptoms/problems? PCP, Specialist, Home health nurse, Urgent Care, ED, 911  Yes   COVID-19 call completed?  Yes          Marc Vincent RN

## 2021-09-03 ENCOUNTER — READMISSION MANAGEMENT (OUTPATIENT)
Dept: CALL CENTER | Facility: HOSPITAL | Age: 76
End: 2021-09-03

## 2021-09-03 NOTE — OUTREACH NOTE
COVID-19 Week 2 Survey      Responses   [facility] patient discharged from?  [place]   Does the patient have one of the following disease processes/diagnoses(primary or secondary)?  COVID-19   COVID-19 underlying condition?  None   Call Number  Call 1   COVID-19 Week 2: Call 1 attempt successful?  Yes   Call start time  1324   Call end time  1328   Discharge diagnosis  Pneumonia due to COVID-19 virus   Meds reviewed with patient/caregiver?  Yes   Is the patient having any side effects they believe may be caused by any medication additions or changes?  No   Does the patient have all medications ordered at discharge?  Yes   Is the patient taking all medications as directed (includes completed medication regime)?  Yes   Does the patient have a primary care provider?   Yes   Comments regarding PCP  PATIENT STATES SHE IS WORKING ON GETTING A NEW PCP   Does the patient have an appointment with their PCP or specialist within 7 days of discharge?  No   What is preventing the patient from scheduling follow up appointments within 7 days of discharge?  [place]'t had time   Nursing Interventions  Educated patient on importance of making appointment, Advised patient to make appointment   Has the patient kept scheduled appointments due by today?  N/A   What is the Home health agency?   [facility]   Has home health visited the patient within 72 hours of discharge?  No   Home health comments  PATIENT STATES SHE DIDN'T NEED HH   Psychosocial issues?  No   Did the patient receive a copy of their discharge instructions?  Yes   Did the patient receive a copy of COVID-19 specific instructions?  Yes   Nursing interventions  Reviewed instructions with patient   What is the patient's perception of their health status since discharge?  Improving   Does the patient have any of the following symptoms?  None   Nursing Interventions  Nurse provided patient education   Pulse Ox monitoring  None   Is the patient/caregiver able to teach back steps  to recovery at home?  Set small, achievable goals for return to baseline health, Rest and rebuild strength, gradually increase activity, Practice good oral hygiene, Eat a well-balance diet, Make a list of questions for provider's appointment   If the patient is a current smoker, are they able to teach back resources for cessation?  Not a smoker   Is the patient/caregiver able to teach back the hierarchy of who to call/visit for symptoms/problems? PCP, Specialist, Home health nurse, Urgent Care, ED, 911  Yes   COVID-19 call completed?  Yes          Mary Stewart LPN

## 2021-09-10 ENCOUNTER — READMISSION MANAGEMENT (OUTPATIENT)
Dept: CALL CENTER | Facility: HOSPITAL | Age: 76
End: 2021-09-10

## 2021-09-10 NOTE — OUTREACH NOTE
COVID-19 Week 3 Survey      Responses   [facility] patient discharged from?  [place]   Does the patient have one of the following disease processes/diagnoses(primary or secondary)?  COVID-19   COVID-19 underlying condition?  None   Call Number  Call 1   COVID-19 Week 3: Call 1 attempt successful?  Yes   Call start time  1108   Call end time  1121   Discharge diagnosis  Pneumonia due to COVID-19 virus   Meds reviewed with patient/caregiver?  Yes   Is the patient having any side effects they believe may be caused by any medication additions or changes?  No   Does the patient have all medications ordered at discharge?  Yes   Is the patient taking all medications as directed (includes completed medication regime)?  Yes   Does the patient have a primary care provider?   Yes   Does the patient have an appointment with their PCP or specialist within 7 days of discharge?  No   What is preventing the patient from scheduling follow up appointments within 7 days of discharge?  Unsure of when or with whom [still working on getting new PCP to replace retired PCP, has rheum appt in Nov, '21]   Nursing Interventions  Verified appointment date/time/provider, Advised patient to make appointment   Has the patient kept scheduled appointments due by today?  N/A   Has home health visited the patient within 72 hours of discharge?  N/A   Psychosocial issues?  No   Comments  plans to get COVID vaccine when appropriate   Did the patient receive a copy of their discharge instructions?  Yes   Did the patient receive a copy of COVID-19 specific instructions?  Yes   Nursing interventions  Reviewed instructions with patient   What is the patient's perception of their health status since discharge?  Improving   Does the patient have any of the following symptoms?  None   Nursing Interventions  Nurse provided patient education   Pulse Ox monitoring  None [plans to get pulse ox]   Is the patient/caregiver able to teach back steps to  recovery at home?  Set small, achievable goals for return to baseline health, Rest and rebuild strength, gradually increase activity, Eat a well-balance diet   Is the patient/caregiver able to teach back the hierarchy of who to call/visit for symptoms/problems? PCP, Specialist, Home health nurse, Urgent Care, ED, 911  Yes   COVID-19 call completed?  Yes          Aline Gardiner RN

## 2021-09-17 ENCOUNTER — READMISSION MANAGEMENT (OUTPATIENT)
Dept: CALL CENTER | Facility: HOSPITAL | Age: 76
End: 2021-09-17

## 2021-09-17 NOTE — OUTREACH NOTE
COVID-19 Week 4 Survey      Responses   [facility] patient discharged from?  [place]   Does the patient have one of the following disease processes/diagnoses(primary or secondary)?  COVID-19   COVID-19 underlying condition?  None   Call Number  Call 1   COVID-19 Week 4: Call 1 attempt successful?  Yes   Call start time  1139   Call end time  1140   Discharge diagnosis  Pneumonia due to COVID-19 virus   Person spoke with today (if not patient) and relationship  Patient   Meds reviewed with patient/caregiver?  Yes   Is the patient having any side effects they believe may be caused by any medication additions or changes?  No   Does the patient have all medications ordered at discharge?  Yes   Is the patient taking all medications as directed (includes completed medication regime)?  Yes   What is the patient's perception of their health status since discharge?  Improving   Does the patient have any of the following symptoms?  None   Nursing Interventions  Nurse provided patient education   Pulse Ox monitoring  None   Is the patient/caregiver able to teach back steps to recovery at home?  Set small, achievable goals for return to baseline health, Rest and rebuild strength, gradually increase activity, Eat a well-balance diet   If the patient is a current smoker, are they able to teach back resources for cessation?  Not a smoker   Is the patient/caregiver able to teach back the hierarchy of who to call/visit for symptoms/problems? PCP, Specialist, Home health nurse, Urgent Care, ED, 911  Yes   Is the patient interested in additional calls from an ambulatory ?  NOTE:  applies to high risk patients requiring additional follow-up.  No   Did the patient feel the follow up calls were helpful during their recovery period?  Yes   Was the number of calls appropriate?  Yes   Wrap up additional comments  Doing better. Still has tired eposides but better.           Marc Vincent RN

## 2021-10-29 NOTE — CASE COMMUNICATION
Attempted to contact patient for SOC.  No return call received.  Patient not admitted due to inability to locate.